# Patient Record
Sex: MALE | Race: WHITE | NOT HISPANIC OR LATINO | Employment: OTHER | URBAN - METROPOLITAN AREA
[De-identification: names, ages, dates, MRNs, and addresses within clinical notes are randomized per-mention and may not be internally consistent; named-entity substitution may affect disease eponyms.]

---

## 2019-10-31 ENCOUNTER — TRANSFERRED RECORDS (OUTPATIENT)
Dept: HEALTH INFORMATION MANAGEMENT | Facility: CLINIC | Age: 74
End: 2019-10-31

## 2019-11-20 ENCOUNTER — TRANSFERRED RECORDS (OUTPATIENT)
Dept: HEALTH INFORMATION MANAGEMENT | Facility: CLINIC | Age: 74
End: 2019-11-20

## 2020-08-06 ENCOUNTER — RECORDS - HEALTHEAST (OUTPATIENT)
Dept: ADMINISTRATIVE | Facility: OTHER | Age: 75
End: 2020-08-06

## 2020-08-06 ENCOUNTER — OFFICE VISIT (OUTPATIENT)
Dept: NEUROLOGY | Facility: CLINIC | Age: 75
End: 2020-08-06
Payer: COMMERCIAL

## 2020-08-06 VITALS — BODY MASS INDEX: 19.9 KG/M2 | WEIGHT: 139 LBS | HEIGHT: 70 IN

## 2020-08-06 DIAGNOSIS — I61.6 MULTIPLE LEFT-SIDED NONTRAUMATIC LOCALIZED INTRACEREBRAL HEMORRHAGES (H): Primary | ICD-10-CM

## 2020-08-06 PROBLEM — S06.5XAA SUBDURAL HEMATOMA (H): Status: ACTIVE | Noted: 2020-06-24

## 2020-08-06 PROBLEM — I10 HTN (HYPERTENSION): Status: RESOLVED | Noted: 2020-07-13 | Resolved: 2020-08-06

## 2020-08-06 PROBLEM — G93.5 COMPRESSION OF BRAIN (H): Status: ACTIVE | Noted: 2020-06-13

## 2020-08-06 PROBLEM — I10 MALIGNANT ESSENTIAL HYPERTENSION: Status: ACTIVE | Noted: 2020-06-24

## 2020-08-06 PROBLEM — I10 HTN (HYPERTENSION): Status: ACTIVE | Noted: 2020-07-13

## 2020-08-06 PROBLEM — I62.9 INTRACRANIAL HEMORRHAGE (H): Status: ACTIVE | Noted: 2020-08-06

## 2020-08-06 PROBLEM — I61.9 INTRACEREBRAL HEMORRHAGE, NONTRAUMATIC (H): Status: ACTIVE | Noted: 2020-06-13

## 2020-08-06 PROBLEM — G93.41 METABOLIC ENCEPHALOPATHY: Status: RESOLVED | Noted: 2020-06-24 | Resolved: 2020-08-06

## 2020-08-06 PROBLEM — G93.5 COMPRESSION OF BRAIN (H): Status: RESOLVED | Noted: 2020-06-13 | Resolved: 2020-08-06

## 2020-08-06 PROBLEM — H52.10 MYOPIA: Status: ACTIVE | Noted: 2020-08-06

## 2020-08-06 PROBLEM — G93.41 METABOLIC ENCEPHALOPATHY: Status: ACTIVE | Noted: 2020-06-24

## 2020-08-06 PROBLEM — I60.9 SAH (SUBARACHNOID HEMORRHAGE) (H): Status: ACTIVE | Noted: 2020-06-14

## 2020-08-06 PROCEDURE — 99214 OFFICE O/P EST MOD 30 MIN: CPT | Mod: 95 | Performed by: PSYCHIATRY & NEUROLOGY

## 2020-08-06 RX ORDER — AMLODIPINE BESYLATE 10 MG/1
10 TABLET ORAL
COMMUNITY
Start: 2020-06-20 | End: 2021-04-19

## 2020-08-06 RX ORDER — METOPROLOL SUCCINATE 50 MG/1
50 TABLET, EXTENDED RELEASE ORAL
COMMUNITY
Start: 2020-07-09 | End: 2024-07-11

## 2020-08-06 RX ORDER — LEVETIRACETAM 500 MG/1
500 TABLET ORAL 2 TIMES DAILY
Qty: 60 TABLET | Refills: 3 | Status: SHIPPED | OUTPATIENT
Start: 2020-08-06 | End: 2020-10-23

## 2020-08-06 RX ORDER — HYDRALAZINE HYDROCHLORIDE 50 MG/1
50 TABLET, FILM COATED ORAL
COMMUNITY
Start: 2020-06-19 | End: 2021-04-19

## 2020-08-06 RX ORDER — AMOXICILLIN 250 MG
2 CAPSULE ORAL
COMMUNITY
Start: 2020-06-19 | End: 2024-07-11

## 2020-08-06 RX ORDER — TAMSULOSIN HYDROCHLORIDE 0.4 MG/1
0.4 CAPSULE ORAL
COMMUNITY
Start: 2020-07-09

## 2020-08-06 RX ORDER — LIDOCAINE 4 G/G
PATCH TOPICAL
COMMUNITY
Start: 2020-07-08 | End: 2021-04-19

## 2020-08-06 RX ORDER — ESCITALOPRAM OXALATE 10 MG/1
10 TABLET ORAL AT BEDTIME
COMMUNITY
Start: 2020-07-09

## 2020-08-06 RX ORDER — LEVETIRACETAM 500 MG/1
500 TABLET ORAL 2 TIMES DAILY
COMMUNITY
Start: 2020-07-10 | End: 2020-08-06

## 2020-08-06 RX ORDER — MAG HYDROX/ALUMINUM HYD/SIMETH 400-400-40
1 SUSPENSION, ORAL (FINAL DOSE FORM) ORAL
COMMUNITY
End: 2021-04-19

## 2020-08-06 RX ORDER — MAGNESIUM HYDROXIDE 400 MG/5ML
SUSPENSION, ORAL (FINAL DOSE FORM) ORAL
COMMUNITY

## 2020-08-06 RX ORDER — MULTIPLE VITAMINS W/ MINERALS TAB 9MG-400MCG
1 TAB ORAL
COMMUNITY

## 2020-08-06 RX ORDER — PSYLLIUM HUSK 0.4 G
1000 CAPSULE ORAL
COMMUNITY
Start: 2020-07-08

## 2020-08-06 RX ORDER — ACETAMINOPHEN 500 MG
1 TABLET ORAL
COMMUNITY
Start: 2020-07-08

## 2020-08-06 SDOH — HEALTH STABILITY: MENTAL HEALTH: HOW OFTEN DO YOU HAVE A DRINK CONTAINING ALCOHOL?: MONTHLY OR LESS

## 2020-08-06 ASSESSMENT — MIFFLIN-ST. JEOR: SCORE: 1366.75

## 2020-08-06 NOTE — LETTER
2020         RE: John Dueñas  [address]  [facility] [address]        Dear Colleague,    Thank you for referring your patient, John Dueñas, to the [facility] [place]. Please see a copy of my visit note below.    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  Location raises the possibility of amyloid angiopathy.  He went for physical therapy and has made quite remarkable improvement.  He was started on Keppra and I have told him as there was extension of blood into the ventricle I would recommend continuing it at least until the end of 2020.  I have recommended:    Continue Keppra 500 mg twice daily until end of 2020.  It can be discontinued provided he remains seizure-free    Repeat MRI of the head and MRA to rule out underlying vascular abnormality    Continue physical and Occupational Therapy    Follow-up in 2 months for face-to-face visit    Thank you again for this referral, please feel free to contact me if you have any questions.    Tom Rivera MD  [facility]  (Formerly, [facility]     HISTORY OF PRESENT ILLNESS     Patient is a 75 years old male with history of hypertension, hyperlipidemia who was admitted to [facility] on 2020 with acute confusion and speech difficulty.  He had a CT of the  head that showed a large left parietal temporal intracerebral hemorrhage.  There was extension into the ventricle and repeat CT scan was done with gradual improvement.  MRI scan showed left temporal hemorrhage and question of amyloid angiopathy was raised.  He was managed with 3% protocol and also started on Keppra.  Due to extension of blood into the ventricle he would continue on Keppra.  He was evaluated by physical and Occupational Therapy and was transferred to rehab.  Since discharge he reports there has been significant improvement in his weakness.  He is able to ambulate.  He denies any headaches.  He has not seen neurosurgery and is wondering if any further testing is needed.  He continues to take Keppra and denies any side effects.    As noted during hospitalization has hemorrhage raise the possibility of amyloid angiopathy although patient denies past history of any hemorrhage.     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    Surgical History:  He  has no past surgical history on file.     SOCIAL HISTORY     Reviewed, and he  reports that he has never smoked. He has never used smokeless tobacco. He reports previous alcohol use.     FAMILY HISTORY     Reviewed, and family history includes Chronic Obstructive Pulmonary Disease in his father; Diabetes in his maternal grandfather; Heart Disease in his brother; Multiple myeloma in his mother.  "    ALLERGIES     Allergies   Allergen Reactions     Ether Other (See Comments)     Lactose Other (See Comments)         REVIEW OF SYSTEMS     A 12 point review of system was performed and was negative except as outlined in the history of present illness.     HOME MEDICATIONS       Current Outpatient Medications:      acetaminophen (TYLENOL) 500 MG tablet, Take 1 tablet by mouth, Disp: , Rfl:      calcium citrate-vitamin D (CITRACAL) 315-250 MG-UNIT TABS per tablet, Take 2 tablets by mouth, Disp: , Rfl:      cholecalciferol (VITAMIN D-1000 MAX ST) 25 MCG (1000 UT) TABS, Take 1,000 Units by mouth, Disp: , Rfl:      escitalopram (LEXAPRO) 5 MG tablet, Take 5 mg by mouth At Bedtime, Disp: , Rfl:      Glucosamine-Chondroit-Vit C-Mn (GLUCOSAMINE CHONDROITINOITIN COMPLEX) CAPS, , Disp: , Rfl:      levETIRAcetam (KEPPRA) 500 MG tablet, Take 500 mg by mouth 2 times daily, Disp: , Rfl:      melatonin 1 MG TABS tablet, Take 4 mg by mouth, Disp: , Rfl:      metoprolol succinate ER (TOPROL-XL) 50 MG 24 hr tablet, Take 50 mg by mouth, Disp: , Rfl:      multivitamin w/minerals (MULTI-VITAMIN) tablet, Take 1 tablet by mouth, Disp: , Rfl:      senna-docusate (SENOKOT-S/PERICOLACE) 8.6-50 MG tablet, Take 2 tablets by mouth, Disp: , Rfl:      tamsulosin (FLOMAX) 0.4 MG capsule, Take 0.4 mg by mouth, Disp: , Rfl:      amLODIPine (NORVASC) 10 MG tablet, Take 10 mg by mouth, Disp: , Rfl:      hydrALAZINE (APRESOLINE) 50 MG tablet, Take 50 mg by mouth, Disp: , Rfl:      Lidocaine (LIDOCARE) 4 % Patch, Apply to intact skin for upto 12 hrs within 24-hr period., Disp: , Rfl:      Omega-3 Fatty Acids (SUPER OMEGA 3 EPA/DHA PO), Take 1 tablet by mouth, Disp: , Rfl:      saw palmetto 450 MG CAPS capsule, Take 1 tablet by mouth, Disp: , Rfl:       PHYSICAL EXAM     Vital signs  Ht 1.778 m (5' 10\")   Wt 63 kg (139 lb)   BMI 19.94 kg/m      Weight:   139 lbs 0 oz    GENERAL PHYSICAL EXAM: Patient is alert and oriented x 4 in no acute distress. " Neck was supple, no carotid bruits, thyromegaly, lymphadenopathy or JVD noted.   NEUROLOGICAL EXAM:  Patient is alert and oriented x3 speech normal no dysarthria or aphasia.  He can repeat sentences.  He can do serial 7 and spell world backwards.  He does report that right side is weaker compared to the left.  Denies any sensory symptoms     DIAGNOSTIC STUDIES     PERTINENT RADIOLOGY  Following imaging studies were reviewed:     CT  6/23/20  1.  Temporal evolution of the large parenchymal hematoma in the left parietal and temporal lobe and casting hemorrhage in the left occipital horn. Blood products have evolved. No new hemorrhage.   2.  Moderate amount of vasogenic edema is similar to the prior study.   3.  7 mm of midline shift to the right side is stable. Basilar cisterns are patent.      6/19/20  . Large area of parenchymal hemorrhage is seen within the left cerebral hemisphere, slightly decreased in size in the interval. Surrounding mass effect surrounding edema with associated mass effect and left-sided sulcal effacement and approximately 6 mm   left-to-right midline shift, similar to prior.  2. Small volume intraventricular hemorrhage and subarachnoid hemorrhage, similar to prior.  3. No finding for interval hemorrhage, mass or acute infarct.     6/15/20  1.  Stable volume of acute intraparenchymal hemorrhage centered in the left parietal and temporal lobes, with stable to slightly increased surrounding edema.  2.  Decreased intraventricular hemorrhage layering in the occipital horns.  3.  Stable subarachnoid hemorrhage along the right temporal region.  4.  Unchanged mass effect with approximately 4 mm of left-to-right midline shift.     MRI  6/13/2020  1.  Large left parietal parenchymal hemorrhage again noted. It is grossly unchanged in size. There is new intraventricular extension layering in the occipital horns and left atrium lateral ventricle compared to the prior study. Temporal horns are   slightly  "more prominent on the prior examination. Close interval follow-up suggested. Overall mass effect and midline shift is similar to the prior examination.     2.  There is multifocal stippled and somewhat nodular enhancement around the periphery of the parietal hemorrhage as detailed above. No definite focal masslike enhancement is identified. This may represent vascular structures and can occasionally be seen   in active bleeding.     3.  Stable thin left convexity subdural hematoma.     4.  Small volume subarachnoid hemorrhage over the right frontal operculum and temporal convexity.     5.  Overall, the constellation of findings is nonspecific. Consider amyloid angiopathy. Given the stable abnormal enhancement, a component of cerebral amyloid angiopathy related inflammation is not excluded though considered less likely.     EEG  6/14/2020  This is a normal EEG during wakefulness and drowsiness except due to mild background dysrhythmia and the EEG being limited due to motion artifact.  EEG is not suggestive of epilepsy.  Further clinical correlation is needed     PERTINENT LABS  Following labs were reviewed:     Ref. Range 6/23/2020 05:14   Sodium Latest Ref Range: 136 - 145 mmol/L 133 (L)   Potassium Latest Ref Range: 3.5 - 5.0 mmol/L 3.5   Chloride Latest Ref Range: 98 - 107 mmol/L 100   CO2 Latest Ref Range: 22 - 31 mmol/L 26   Anion Gap, Calculation Latest Ref Range: 5 - 18 mmol/L 7   BUN Latest Ref Range: 8 - 28 mg/dL 15   Creatinine Latest Ref Range: 0.70 - 1.30 mg/dL 0.73   GFR MDRD Af Amer Latest Ref Range: >60 mL/min/1.73m2 >60   GFR MDRD Non Af Amer Latest Ref Range: >60 mL/min/1.73m2 >60   Calcium Latest Ref Range: 8.5 - 10.5 mg/dL 8.1 (L)   Glucose Latest Ref Range: 70 - 125 mg/dL 109         TELEPHONE VISIT CONSENT   This telephone visit was done in lieu of a face to face visit due to COVID 19 pandemic. The patient has been notified of following:    \"This telephone visit will be conducted via a call " "between you and your physician/provider. We have found that certain health care needs can be provided without the need for a physical exam. This service lets us provide the care you need with a short phone conversation. If a prescription is necessary we can send it directly to your pharmacy. If lab work is needed we can place an order for that and you can then stop by our lab to have the test done at a later time. If during the course of the call the physician/provider feels a telephone visit is not appropriate, you will not be charged for this service.\"    Patient has given verbal consent for Telephone visit? YES  Consent has been obtained for this service by 1 care team member: YES    Total Time: visit 30 minutes    Total time spent for face to face visit, reviewing labs/imaging studies, counseling and coordination of care was: 30 Minutes More than 50% of this time was spent on counseling and coordination of care.      This note was dictated using voice recognition software.  Any grammatical or context distortions are unintentional and inherent to the software.           Again, thank you for allowing me to participate in the care of your patient.        Sincerely,        Tom Rivera MD    "

## 2020-08-06 NOTE — NURSING NOTE
Chief Complaint   Patient presents with     Stroke     Hospital follow up- doing home PT at this time which is helping     Phone visit     Call [phone]     Aura Felipe CMA on 8/6/2020 at 11:17 AM

## 2020-08-06 NOTE — PATIENT INSTRUCTIONS
Patient Education     Arm Care After a Stroke    After a stroke, many people are left with problems with one of their arms. Proper arm care after a stroke can help treat these problems. It can also help prevent new problems from starting. Arm care may include placing the arms in the proper position, using devices such as a sling or brace, and taking care to prevent further injury during rehabilitation.  How a stroke can cause arm problems  Stroke often causes paralysis (hemiplegia) or weakness (hemiparalysis) of one or more of the muscles in your arm or shoulder. The muscles might feel tight instead of weak (spasticity). In general, stroke might increase or decrease the normal tension (muscle tone) in these muscles. You may also have numbness or limited feeling in your arm.  The shoulder is often a problem area after a stroke. The shoulder blade (scapula) and the upper arm bone (humerus) come together to form the shoulder joint. This joint is shaped like a ball and socket. Problems with the shoulder muscles can cause this joint to partly dislocate because of the weight of your arm. This partial separation (subluxation) makes your shoulder droop down.  Subluxation can cause pain when you move your arm. You may also feel like your shoulder is out of joint. Muscles, tendons, and ligaments can become overstretched. These muscle problems can lead to other problems with your shoulder. For example, you may not be able to move your shoulder as much as you used to. Some of the muscles may also be permanently shortened. This is called contracture.  How arm care helps  Arm care after a stroke helps prevent and treat problems. If you have had a stroke, it is very likely that you will need some sort of arm care treatment while you recover function. Most people who have a stroke need treatment for trouble with the muscles of their arm or shoulder, and shoulder pain is common. This treatment often starts right after a stroke.  Even if you only have minor harm from your stroke, proper arm care can help keep future problems from occurring.  Your treatment plan  Learn everything you can about your treatment plan. Your healthcare team will work with you to design a treatment plan to fit your needs. You may work with a physiatrist. This is a doctor who specializes in rehabilitative medicine. You will likely work with a physical therapist. This is a therapist who can teach you safe exercises to improve the strength, endurance, and range of motion in your arm, shoulder, and hand. An occupational therapist can help you learn to regain skills needed for everyday living using your arm. This may include using assistive devices, such as braces or arm rails.  Expect your treatment plan to change as you recover. Talk with the members of your medical team about how things are going. If an exercise causes pain, stop the exercise and let someone know right away.  Some people regain full use of their arm in the weeks after a stroke. Many others still have some weakness, pain, or other problems with their arm. You may continue to benefit from arm therapy. Your medical team can tailor your treatment plan to your needs.  Protecting your shoulder joint  Preventing subluxation is one of the most important goals of arm care after a stroke. To prevent this problem, you must protect your arm at the shoulder joint. You will need to control the shoulder joint when you move around. Make sure all of your caregivers know about the proper ways to help you.    Don't let anyone pull on your arm.    Don't let anyone help you stand or move by lifting under your armpits.    Don't lean your body weight on anything in your armpit while standing or walking.    Keep your affected arm supported and immobile when you stand up. Use your strong arm to help pull yourself up.    Keep your arm in a sling or harness after your stroke, if advised.    Support your affected arm while  sitting. If you re in a wheelchair, rest it on one of the chair s arm. You can also rest your arm on things such as a lap tray or pillow.  Other types of proper positioning    When lying on your unaffected side, use 1 or 2 pillows for your head. Your affected shoulder should be forward, with your arm resting on a pillow.    When lying on your affected side, use 1 or 2 pillows for your head. Your affected shoulder should be positioned comfortably.    When sitting up, sit fully back into the chair. Place your arms forward onto 2 pillows on a table. Your feet should be flat on the floor.    When lying on your back, place 3 pillows supporting both your shoulders and your head. Place your affected arm on a pillow.    When sitting in bed, sit upright, well supported by pillows. Place both arms on pillows. This is usually only recommended for limited periods.  Your physical therapist may advise other positions that are safe for you. You may also do physical therapy exercises. These are to help you regain strength and flexibility in your muscles.  Additional treatments  If you continue to have arm problems, your healthcare team might try other treatments such as:    Constraint-induced movement therapy. This involves using your affected arm a lot and not using your unaffected arm. A therapist might help you with this. Or it could be robot-assisted.    Botulinum toxin injections. This can help to reduce tightness in the arm muscles.    Electrical stimulation of muscles. The weakened muscles in your arm or shoulder may be treated with electricity. This can help strengthen your weakened arm.    Electrical stimulation of the brain. This may be done during rehabilitation exercises and may help you be able to move your arm better.    Motor imagery. This method may help you be able to use your arm more easily.    Biofeedback exercises. These may help you be able to move your arm better and reduce pain.    Pain medicine. These may  be needed to ease shoulder pain if subluxation has occurred.  Depending on your situation, these treatments might be used early or late in your therapy. Ongoing physical therapy may also help you reduce chronic pain as you regain your strength and flexibility.  Date Last Reviewed: 6/1/2017 2000-2019 [employer]. [address] [place] [address], [place] [address]. All rights reserved. This information is not intended as a substitute for professional medical care. Always follow your healthcare professional's instructions.

## 2020-08-06 NOTE — PROGRESS NOTES
NEUROLOGY FOLLOW UP VISIT  NOTE       [facility] NEUROLOGY [place]  [address] [place] [address]  Tel: [phone]  Fax: [phone]  [email or web address]     John DueñasJAN 1945, MRN [id number]  PCP: Malcolm Sun, [phone]  Date: 2020     ASSESSMENT & PLAN     Diagnosis code: Intracranial hemorrhage     Left parietal ICH with extension into ventricle, likely hypertensive, R/O amyloid angiopathy  75 years old male with history of hypertension, impaired fasting glucose, hyperlipidemia who was admitted to the hospital with large left parietal intracerebral hemorrhage with extension into the ventricles.  Location raises the possibility of amyloid angiopathy.  He went for physical therapy and has made quite remarkable improvement.  He was started on Keppra and I have told him as there was extension of blood into the ventricle I would recommend continuing it at least until the end of 2020.  I have recommended:    Continue Keppra 500 mg twice daily until end of 2020.  It can be discontinued provided he remains seizure-free    Repeat MRI of the head and MRA to rule out underlying vascular abnormality    Continue physical and Occupational Therapy    Follow-up in 2 months for face-to-face visit    Thank you again for this referral, please feel free to contact me if you have any questions.    Tom Rivera MD  [facility]  (Formerly, [facility]     HISTORY OF PRESENT ILLNESS     Patient is a 75 years old male with history of hypertension, hyperlipidemia who was admitted to [facility] on 2020 with acute confusion and speech difficulty.  He had a CT of the head that showed a large left parietal temporal intracerebral hemorrhage.  There was extension into the ventricle and repeat CT scan was done with gradual improvement.  MRI scan showed left temporal hemorrhage and question of amyloid angiopathy was  raised.  He was managed with 3% protocol and also started on Keppra.  Due to extension of blood into the ventricle he would continue on Keppra.  He was evaluated by physical and Occupational Therapy and was transferred to rehab.  Since discharge he reports there has been significant improvement in his weakness.  He is able to ambulate.  He denies any headaches.  He has not seen neurosurgery and is wondering if any further testing is needed.  He continues to take Keppra and denies any side effects.    As noted during hospitalization has hemorrhage raise the possibility of amyloid angiopathy although patient denies past history of any hemorrhage.     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    Surgical History:  He  has no past surgical history on file.     SOCIAL HISTORY     Reviewed, and he  reports that he has never smoked. He has never used smokeless tobacco. He reports previous alcohol use.     FAMILY HISTORY     Reviewed, and family history includes Chronic Obstructive Pulmonary Disease in his father; Diabetes in his maternal grandfather; Heart Disease in his brother; Multiple myeloma in his mother.     ALLERGIES     Allergies   Allergen Reactions     Ether Other (See Comments)     Lactose Other (See Comments)         REVIEW OF SYSTEMS     A 12 point review of system was performed and was negative except as outlined in the history of present  "illness.     HOME MEDICATIONS       Current Outpatient Medications:      acetaminophen (TYLENOL) 500 MG tablet, Take 1 tablet by mouth, Disp: , Rfl:      calcium citrate-vitamin D (CITRACAL) 315-250 MG-UNIT TABS per tablet, Take 2 tablets by mouth, Disp: , Rfl:      cholecalciferol (VITAMIN D-1000 MAX ST) 25 MCG (1000 UT) TABS, Take 1,000 Units by mouth, Disp: , Rfl:      escitalopram (LEXAPRO) 5 MG tablet, Take 5 mg by mouth At Bedtime, Disp: , Rfl:      Glucosamine-Chondroit-Vit C-Mn (GLUCOSAMINE CHONDROITINOITIN COMPLEX) CAPS, , Disp: , Rfl:      levETIRAcetam (KEPPRA) 500 MG tablet, Take 500 mg by mouth 2 times daily, Disp: , Rfl:      melatonin 1 MG TABS tablet, Take 4 mg by mouth, Disp: , Rfl:      metoprolol succinate ER (TOPROL-XL) 50 MG 24 hr tablet, Take 50 mg by mouth, Disp: , Rfl:      multivitamin w/minerals (MULTI-VITAMIN) tablet, Take 1 tablet by mouth, Disp: , Rfl:      senna-docusate (SENOKOT-S/PERICOLACE) 8.6-50 MG tablet, Take 2 tablets by mouth, Disp: , Rfl:      tamsulosin (FLOMAX) 0.4 MG capsule, Take 0.4 mg by mouth, Disp: , Rfl:      amLODIPine (NORVASC) 10 MG tablet, Take 10 mg by mouth, Disp: , Rfl:      hydrALAZINE (APRESOLINE) 50 MG tablet, Take 50 mg by mouth, Disp: , Rfl:      Lidocaine (LIDOCARE) 4 % Patch, Apply to intact skin for upto 12 hrs within 24-hr period., Disp: , Rfl:      Omega-3 Fatty Acids (SUPER OMEGA 3 EPA/DHA PO), Take 1 tablet by mouth, Disp: , Rfl:      saw palmetto 450 MG CAPS capsule, Take 1 tablet by mouth, Disp: , Rfl:       PHYSICAL EXAM     Vital signs  Ht 1.778 m (5' 10\")   Wt 63 kg (139 lb)   BMI 19.94 kg/m      Weight:   139 lbs 0 oz    GENERAL PHYSICAL EXAM: Patient is alert and oriented x 4 in no acute distress. Neck was supple, no carotid bruits, thyromegaly, lymphadenopathy or JVD noted.   NEUROLOGICAL EXAM:  Patient is alert and oriented x3 speech normal no dysarthria or aphasia.  He can repeat sentences.  He can do serial 7 and spell world backwards.  " He does report that right side is weaker compared to the left.  Denies any sensory symptoms     DIAGNOSTIC STUDIES     PERTINENT RADIOLOGY  Following imaging studies were reviewed:     CT  6/23/20  1.  Temporal evolution of the large parenchymal hematoma in the left parietal and temporal lobe and casting hemorrhage in the left occipital horn. Blood products have evolved. No new hemorrhage.   2.  Moderate amount of vasogenic edema is similar to the prior study.   3.  7 mm of midline shift to the right side is stable. Basilar cisterns are patent.      6/19/20  . Large area of parenchymal hemorrhage is seen within the left cerebral hemisphere, slightly decreased in size in the interval. Surrounding mass effect surrounding edema with associated mass effect and left-sided sulcal effacement and approximately 6 mm   left-to-right midline shift, similar to prior.  2. Small volume intraventricular hemorrhage and subarachnoid hemorrhage, similar to prior.  3. No finding for interval hemorrhage, mass or acute infarct.     6/15/20  1.  Stable volume of acute intraparenchymal hemorrhage centered in the left parietal and temporal lobes, with stable to slightly increased surrounding edema.  2.  Decreased intraventricular hemorrhage layering in the occipital horns.  3.  Stable subarachnoid hemorrhage along the right temporal region.  4.  Unchanged mass effect with approximately 4 mm of left-to-right midline shift.     MRI  6/13/2020  1.  Large left parietal parenchymal hemorrhage again noted. It is grossly unchanged in size. There is new intraventricular extension layering in the occipital horns and left atrium lateral ventricle compared to the prior study. Temporal horns are   slightly more prominent on the prior examination. Close interval follow-up suggested. Overall mass effect and midline shift is similar to the prior examination.     2.  There is multifocal stippled and somewhat nodular enhancement around the periphery of the  "parietal hemorrhage as detailed above. No definite focal masslike enhancement is identified. This may represent vascular structures and can occasionally be seen   in active bleeding.     3.  Stable thin left convexity subdural hematoma.     4.  Small volume subarachnoid hemorrhage over the right frontal operculum and temporal convexity.     5.  Overall, the constellation of findings is nonspecific. Consider amyloid angiopathy. Given the stable abnormal enhancement, a component of cerebral amyloid angiopathy related inflammation is not excluded though considered less likely.     EEG  6/14/2020  This is a normal EEG during wakefulness and drowsiness except due to mild background dysrhythmia and the EEG being limited due to motion artifact.  EEG is not suggestive of epilepsy.  Further clinical correlation is needed     PERTINENT LABS  Following labs were reviewed:     Ref. Range 6/23/2020 05:14   Sodium Latest Ref Range: 136 - 145 mmol/L 133 (L)   Potassium Latest Ref Range: 3.5 - 5.0 mmol/L 3.5   Chloride Latest Ref Range: 98 - 107 mmol/L 100   CO2 Latest Ref Range: 22 - 31 mmol/L 26   Anion Gap, Calculation Latest Ref Range: 5 - 18 mmol/L 7   BUN Latest Ref Range: 8 - 28 mg/dL 15   Creatinine Latest Ref Range: 0.70 - 1.30 mg/dL 0.73   GFR MDRD Af Amer Latest Ref Range: >60 mL/min/1.73m2 >60   GFR MDRD Non Af Amer Latest Ref Range: >60 mL/min/1.73m2 >60   Calcium Latest Ref Range: 8.5 - 10.5 mg/dL 8.1 (L)   Glucose Latest Ref Range: 70 - 125 mg/dL 109         TELEPHONE VISIT CONSENT   This telephone visit was done in lieu of a face to face visit due to COVID 19 pandemic. The patient has been notified of following:    \"This telephone visit will be conducted via a call between you and your physician/provider. We have found that certain health care needs can be provided without the need for a physical exam. This service lets us provide the care you need with a short phone conversation. If a prescription is necessary we " "can send it directly to your pharmacy. If lab work is needed we can place an order for that and you can then stop by our lab to have the test done at a later time. If during the course of the call the physician/provider feels a telephone visit is not appropriate, you will not be charged for this service.\"    Patient has given verbal consent for Telephone visit? YES  Consent has been obtained for this service by 1 care team member: YES    Total Time: visit 30 minutes    Total time spent for face to face visit, reviewing labs/imaging studies, counseling and coordination of care was: 30 Minutes More than 50% of this time was spent on counseling and coordination of care.      This note was dictated using voice recognition software.  Any grammatical or context distortions are unintentional and inherent to the software.         "

## 2020-08-31 ENCOUNTER — HOSPITAL ENCOUNTER (OUTPATIENT)
Dept: MRI IMAGING | Facility: HOSPITAL | Age: 75
Discharge: HOME | End: 2020-08-31
Attending: PSYCHIATRY & NEUROLOGY

## 2020-08-31 ENCOUNTER — RECORDS - HEALTHEAST (OUTPATIENT)
Dept: LAB | Facility: HOSPITAL | Age: 75
End: 2020-08-31

## 2020-08-31 DIAGNOSIS — I61.6 MULTIPLE LEFT-SIDED NONTRAUMATIC LOCALIZED INTRACEREBRAL HEMORRHAGES (H): ICD-10-CM

## 2020-08-31 LAB — LEVETIRACETAM (KEPPRA): 24.6 UG/ML (ref 6–46)

## 2020-10-05 ENCOUNTER — OFFICE VISIT (OUTPATIENT)
Dept: NEUROLOGY | Facility: CLINIC | Age: 75
End: 2020-10-05
Payer: COMMERCIAL

## 2020-10-05 ENCOUNTER — RECORDS - HEALTHEAST (OUTPATIENT)
Dept: ADMINISTRATIVE | Facility: OTHER | Age: 75
End: 2020-10-05

## 2020-10-05 VITALS — BODY MASS INDEX: 19.76 KG/M2 | HEIGHT: 70 IN | WEIGHT: 138 LBS

## 2020-10-05 DIAGNOSIS — I61.0 NONTRAUMATIC SUBCORTICAL HEMORRHAGE OF LEFT CEREBRAL HEMISPHERE (H): Primary | ICD-10-CM

## 2020-10-05 DIAGNOSIS — I68.0 CEREBRAL AMYLOID ANGIOPATHY (CODE): ICD-10-CM

## 2020-10-05 PROCEDURE — 99214 OFFICE O/P EST MOD 30 MIN: CPT | Performed by: PSYCHIATRY & NEUROLOGY

## 2020-10-05 RX ORDER — MEMANTINE HYDROCHLORIDE 5 MG/1
1 TABLET ORAL 2 TIMES DAILY
COMMUNITY
Start: 2020-09-12 | End: 2021-04-19

## 2020-10-05 RX ORDER — LISINOPRIL 10 MG/1
TABLET ORAL
COMMUNITY
Start: 2020-07-09 | End: 2024-07-11

## 2020-10-05 ASSESSMENT — MIFFLIN-ST. JEOR: SCORE: 1362.21

## 2020-10-05 NOTE — LETTER
10/5/2020         RE: John Dueñas  [address] [facility] [address]        Dear Colleague,    Thank you for referring your patient, John Dueñas, to the [facility] [place]. Please see a copy of my visit note below.    NEUROLOGY FOLLOW UP VISIT  NOTE       [facility] NEUROLOGY [place]  [address] [place] [address]  Tel: [phone]  Fax: [phone]  [email or web address]     John Dueñas,  1945, MRN [id number]  PCP: Malcolm Sun, [phone]  Date: 10/5/2020     ASSESSMENT & PLAN     Diagnosis code: Intracranial hemorrhage     Left parietal ICH with extension into ventricle, likely hypertensive, R/O amyloid angiopathy  75 years old male with history of hypertension, hyperlipidemia who was admitted to [facility] in 2020 with confusion and speech difficulty.  CT of the head at that time showed large left parietal temporal intracerebral hemorrhage.  This has continued to get smaller in size on most recent MRI scan done on 2020 continues to show hemorrhage but a repeat MRI with contrast was recommended once blood is completely reabsorbed.  He has continued on Keppra and most recent level was therapeutic.  I have recommended continuing with Keppra for another 3 months and to repeat MRI head with and without contrast and if no underlying mass lesion is noted, I will taper him off Keppra.  He is also struggling with short-term memory and after the blood is reabsorbed I will refer him for neuropsych testing.  I should note that he was started on Namenda by his primary care physician.  Follow-up will be in 3 months    Thank you again for this referral, please feel free to contact me if you have any questions.    Tmo Rivera MD  [facility]  (Former[facility], [facility]     HISTORY OF PRESENT ILLNESS     Patient is 75 years old male with history of hypertension, hyperlipidemia  who was admitted to [facility] on [facility]/13/2020 with confusion and speech difficulty.  CT of the head at that time showed a large left parietal temporal intracerebral hemorrhage.  There was extension into the ventricle and repeat CT scan was done with gradual improvement.  MRI scan showed left temporal hemorrhage and question of amyloid angiopathy was raised.  He was started on Keppra and was discharged.  He went through physical therapy and acute rehab with improvement.  He had a repeat MRI done on 8/31/2020 that shows the intraparenchymal hemorrhage in the left parietal lobe with extension into the left temporoparietal region has significantly decreased.  A repeat MRI with contrast was recommended once blood is completely reabsorbed.  He is currently on Keppra and a recent Keppra level was normal.    During hospitalization location of the hemorrhage raised the possibility of amyloid angiopathy.  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    Surgical History:  He  has a past surgical history that includes tonsillectomy.     SOCIAL HISTORY     Reviewed, and he  reports that he has never smoked. He has never used smokeless tobacco. He reports previous alcohol use.     FAMILY HISTORY     Reviewed, and family history includes Chronic Obstructive Pulmonary Disease in his father; Diabetes in his maternal grandfather; Heart Disease in his brother; Multiple myeloma in his mother.     ALLERGIES     Allergies    Allergen Reactions     Ether Other (See Comments)     Lactose Other (See Comments)         REVIEW OF SYSTEMS     A 12 point review of system was performed and was negative except as outlined in the history of present illness.     HOME MEDICATIONS       Current Outpatient Medications:      acetaminophen (TYLENOL) 500 MG tablet, Take 1 tablet by mouth, Disp: , Rfl:      calcium citrate-vitamin D (CITRACAL) 315-250 MG-UNIT TABS per tablet, Take 2 tablets by mouth, Disp: , Rfl:      cholecalciferol (VITAMIN D-1000 MAX ST) 25 MCG (1000 UT) TABS, Take 1,000 Units by mouth, Disp: , Rfl:      escitalopram (LEXAPRO) 5 MG tablet, Take 5 mg by mouth At Bedtime, Disp: , Rfl:      Glucosamine-Chondroit-Vit C-Mn (GLUCOSAMINE CHONDROITINOITIN COMPLEX) CAPS, , Disp: , Rfl:      levETIRAcetam (KEPPRA) 500 MG tablet, Take 1 tablet (500 mg) by mouth 2 times daily, Disp: 60 tablet, Rfl: 3     lisinopril (ZESTRIL) 10 MG tablet, TAKE 1 TABLET BY MOUTH EVERY EVENING, Disp: , Rfl:      melatonin 1 MG TABS tablet, Take 3 mg by mouth , Disp: , Rfl:      memantine (NAMENDA) 5 MG tablet, Take 1 tablet by mouth 2 times daily, Disp: , Rfl:      metoprolol succinate ER (TOPROL-XL) 50 MG 24 hr tablet, Take 50 mg by mouth, Disp: , Rfl:      multivitamin w/minerals (MULTI-VITAMIN) tablet, Take 1 tablet by mouth, Disp: , Rfl:      senna-docusate (SENOKOT-S/PERICOLACE) 8.6-50 MG tablet, Take 2 tablets by mouth, Disp: , Rfl:      tamsulosin (FLOMAX) 0.4 MG capsule, Take 0.4 mg by mouth, Disp: , Rfl:      vitamin B-12 (CYANOCOBALAMIN) 100 MCG tablet, Take 100 mcg by mouth, Disp: , Rfl:      amLODIPine (NORVASC) 10 MG tablet, Take 10 mg by mouth, Disp: , Rfl:      hydrALAZINE (APRESOLINE) 50 MG tablet, Take 50 mg by mouth, Disp: , Rfl:      Lidocaine (LIDOCARE) 4 % Patch, Apply to intact skin for upto 12 hrs within 24-hr period., Disp: , Rfl:      Omega-3 Fatty Acids (SUPER OMEGA 3 EPA/DHA PO), Take 1 tablet by mouth, Disp: , Rfl:      saw palmetto 450  "MG CAPS capsule, Take 1 tablet by mouth, Disp: , Rfl:       PHYSICAL EXAM     Vital signs  Ht 1.778 m (5' 10\")   Wt 62.6 kg (138 lb)   BMI 19.80 kg/m      Weight:   138 lbs 0 oz    GENERAL PHYSICAL EXAM: Patient is alert and oriented x 4 in no acute distress. Neck was supple, no carotid bruits, thyromegaly, lymphadenopathy or JVD noted.   NEUROLOGICAL EXAM:  Patient is alert and oriented times 3 he can tell me his name date and place.  He can tell me the name of the president but he struggles with serial 7.  Pupil equal round and reactive face symmetrical tongue midline.  He has right pronator drift.  Strength on the right 5-/5 on the left 5/5.  Reflexes 2+ on the right 1+ on the left he has some dysmetria on finger-nose testing he has a wide-based gait and somewhat unstable     DIAGNOSTIC STUDIES     PERTINENT RADIOLOGY  Following imaging studies were reviewed:     CT  6/23/20  1.  Temporal evolution of the large parenchymal hematoma in the left parietal and temporal lobe and casting hemorrhage in the left occipital horn. Blood products have evolved. No new hemorrhage.   2.  Moderate amount of vasogenic edema is similar to the prior study.   3.  7 mm of midline shift to the right side is stable. Basilar cisterns are patent.      6/19/20  . Large area of parenchymal hemorrhage is seen within the left cerebral hemisphere, slightly decreased in size in the interval. Surrounding mass effect surrounding edema with associated mass effect and left-sided sulcal effacement and approximately 6 mm   left-to-right midline shift, similar to prior.  2. Small volume intraventricular hemorrhage and subarachnoid hemorrhage, similar to prior.  3. No finding for interval hemorrhage, mass or acute infarct.     6/15/20  1.  Stable volume of acute intraparenchymal hemorrhage centered in the left parietal and temporal lobes, with stable to slightly increased surrounding edema.  2.  Decreased intraventricular hemorrhage layering in the " occipital horns.  3.  Stable subarachnoid hemorrhage along the right temporal region.  4.  Unchanged mass effect with approximately 4 mm of left-to-right midline shift.     MRI  8/31/20  1.  Previously described intraparenchymal hematoma in the left parietal lobe with extension to the left temporoparietal region has markedly decreased in size since previous, now measuring 3.0 x 1.2 cm in AP x transverse dimensions. This primarily   involves the lateral left parietal lobe with slight extension to the left frontoparietal region and is accompanied by hemosiderin staining extending into the left temporoparietal region. While there is a small amount of FLAIR hyperintense diffusion   restriction compatible with acute to very early subacute infarct along the anterior and medial margin of the above-described residual intraparenchymal hemorrhage in the left parietal lobe, this presumably relates to an evolving parenchymal injury from   the above-described intraparenchymal hematoma and is accompanied by a background of adjacent FLAIR hyperintense presumed gliosis. Without contrast, evaluation for an abnormal underlying mass is limited. Follow-up contrast-enhanced brain MRI after   resolution of the above-described residual intraparenchymal hemorrhage will be of benefit to exclude an underlying mass.     2.  Previously described subarachnoid hemorrhage and intraventricular extension of hemorrhage have resolved in the interim.     3.  Hemosiderin staining in the left parietal lobe and left temporoparietal region is accompanied by a small amount of hemosiderin staining along the atrium and occipital horn of the left lateral ventricle.  6/13/2020  1.  Large left parietal parenchymal hemorrhage again noted. It is grossly unchanged in size. There is new intraventricular extension layering in the occipital horns and left atrium lateral ventricle compared to the prior study. Temporal horns are   slightly more prominent on the prior  examination. Close interval follow-up suggested. Overall mass effect and midline shift is similar to the prior examination.     2.  There is multifocal stippled and somewhat nodular enhancement around the periphery of the parietal hemorrhage as detailed above. No definite focal masslike enhancement is identified. This may represent vascular structures and can occasionally be seen   in active bleeding.     3.  Stable thin left convexity subdural hematoma.     4.  Small volume subarachnoid hemorrhage over the right frontal operculum and temporal convexity.     5.  Overall, the constellation of findings is nonspecific. Consider amyloid angiopathy. Given the stable abnormal enhancement, a component of cerebral amyloid angiopathy related inflammation is not excluded though considered less likely.     EEG  6/14/2020  This is a normal EEG during wakefulness and drowsiness except due to mild background dysrhythmia and the EEG being limited due to motion artifact.  EEG is not suggestive of epilepsy.  Further clinical correlation is needed     PERTINENT LABS  Following labs were reviewed:    Ref Range & Units 8/31/20 0906       Levetiracetam 6.0 - 46.0 ug/mL 24.6       Latest known visit with results is:   No results found for any previous visit.         Total time spent for face to face visit, reviewing labs/imaging studies, counseling and coordination of care was: 30 Minutes More than 50% of this time was spent on counseling and coordination of care.      This note was dictated using voice recognition software.  Any grammatical or context distortions are unintentional and inherent to the software.             Again, thank you for allowing me to participate in the care of your patient.        Sincerely,        Tom Rivera MD

## 2020-10-05 NOTE — NURSING NOTE
Interface, Rad Results In - 08/31/2020 10:02 AM CDT    EXAM: MR BRAIN WO CONTRAST  LOCATION: [facility]  DATE/TIME: 8/31/2020 8:45 AM    INDICATION: Nontraumatic intracerebral hemorrhage, multiple localized.  COMPARISON: 6/23/2020 and 6/19/2020 head CTs. 6/13/2020 brain MRI.  TECHNIQUE: Routine multiplanar, multisequence head MRI without intravenous contrast.    FINDINGS:  INTRACRANIAL CONTENTS: Previously described intraparenchymal hematoma demonstrates marked interval decrease in size and is now seen to involve the left parietal lobe with slight extension to the left frontoparietal region. This now measures 3.0 x 1.2 cm   in AP x transverse dimensions. Susceptibility marginates this small residual intraparenchymal hematoma and is accompanied by marked hemosiderin staining in the inferior left parietal lobe extending to the left temporoparietal region and the   posterolateral left temporal lobe. Hemosiderin staining extends up to the atrium of the left lateral ventricle and along the walls of the left occipital horn. Small amount of FLAIR hyperintense diffusion restriction seen along the medial and anterior   margin of the small residual intraparenchymal hematoma in the left parietal lobe relates to a small focus of adjacent infarct relating to this evolving hemorrhage. This is accompanied by a small amount of adjacent FLAIR hyperintense presumed gliosis and   is accompanied by mild ex vacuo dilatation of the atrium, occipital horn, and temporal horn of the left lateral ventricle. Without contrast, evaluation for an underlying abnormal mass is limited.    Previously present intraventricular hemorrhage and subarachnoid hemorrhage is no longer identified. No extraaxial collection. Background chronic small vessel ischemic changes are minimal. Mild to moderate diffuse parenchymal volume loss. Ventricular size  is in keeping with this volume loss and accompanied by ex vacuo dilatation in the posterior left  lateral ventricle as detailed above. Major intracranial vascular flow voids are preserved. Brainstem and cerebellum are unremarkable. Cerebellar tonsils are  normally positioned.    SELLA: No abnormality accounting for technique.    OSSEOUS STRUCTURES/SOFT TISSUES: Normal marrow signal.     ORBITS: No abnormality accounting for technique.     SINUSES/MASTOIDS: No paranasal sinus mucosal disease. No middle ear or mastoid effusion.     IMPRESSION:   1. Previously described intraparenchymal hematoma in the left parietal lobe with extension to the left temporoparietal region has markedly decreased in size since previous, now measuring 3.0 x 1.2 cm in AP x transverse dimensions. This primarily   involves the lateral left parietal lobe with slight extension to the left frontoparietal region and is accompanied by hemosiderin staining extending into the left temporoparietal region. While there is a small amount of FLAIR hyperintense diffusion   restriction compatible with acute to very early subacute infarct along the anterior and medial margin of the above-described residual intraparenchymal hemorrhage in the left parietal lobe, this presumably relates to an evolving parenchymal injury from   the above-described intraparenchymal hematoma and is accompanied by a background of adjacent FLAIR hyperintense presumed gliosis. Without contrast, evaluation for an abnormal underlying mass is limited. Follow-up contrast-enhanced brain MRI after   resolution of the above-described residual intraparenchymal hemorrhage will be of benefit to exclude an underlying mass.    2. Previously described subarachnoid hemorrhage and intraventricular extension of hemorrhage have resolved in the interim.    3. Hemosiderin staining in the left parietal lobe and left temporoparietal region is accompanied by a small amount of hemosiderin staining along the atrium and occipital horn of the left lateral ventricle.    4. Unchanged age-related changes, as  above.

## 2020-10-05 NOTE — NURSING NOTE
Chief Complaint   Patient presents with     Results     Discuss MRI and lab results     Aura Felipe CMA on 10/5/2020 at 9:43 AM

## 2020-10-05 NOTE — PROGRESS NOTES
NEUROLOGY FOLLOW UP VISIT  NOTE       [facility] NEUROLOGY [place]  [address] [place] [address]  Tel: [phone]  Fax: [phone]  [email or web address]     John DueñasJAN 1945, MRN [id number]  PCP: Malcolm Sun, [phone]  Date: 10/5/2020     ASSESSMENT & PLAN     Diagnosis code: Intracranial hemorrhage     Left parietal ICH with extension into ventricle, likely hypertensive, R/O amyloid angiopathy  75 years old male with history of hypertension, hyperlipidemia who was admitted to [facility] in 2020 with confusion and speech difficulty.  CT of the head at that time showed large left parietal temporal intracerebral hemorrhage.  This has continued to get smaller in size on most recent MRI scan done on 2020 continues to show hemorrhage but a repeat MRI with contrast was recommended once blood is completely reabsorbed.  He has continued on Keppra and most recent level was therapeutic.  I have recommended continuing with Keppra for another 3 months and to repeat MRI head with and without contrast and if no underlying mass lesion is noted, I will taper him off Keppra.  He is also struggling with short-term memory and after the blood is reabsorbed I will refer him for neuropsych testing.  I should note that he was started on Namenda by his primary care physician.  Follow-up will be in 3 months    Thank you again for this referral, please feel free to contact me if you have any questions.    Tom Rivera MD  [facility]  (Formerly, [facility]     HISTORY OF PRESENT ILLNESS     Patient is 75 years old male with history of hypertension, hyperlipidemia who was admitted to [facility] on 2020 with confusion and speech difficulty.  CT of the head at that time showed a large left parietal temporal intracerebral hemorrhage.  There was extension into the ventricle and repeat CT scan was done with gradual  improvement.  MRI scan showed left temporal hemorrhage and question of amyloid angiopathy was raised.  He was started on Keppra and was discharged.  He went through physical therapy and acute rehab with improvement.  He had a repeat MRI done on 8/31/2020 that shows the intraparenchymal hemorrhage in the left parietal lobe with extension into the left temporoparietal region has significantly decreased.  A repeat MRI with contrast was recommended once blood is completely reabsorbed.  He is currently on Keppra and a recent Keppra level was normal.    During hospitalization location of the hemorrhage raised the possibility of amyloid angiopathy.  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    Surgical History:  He  has a past surgical history that includes tonsillectomy.     SOCIAL HISTORY     Reviewed, and he  reports that he has never smoked. He has never used smokeless tobacco. He reports previous alcohol use.     FAMILY HISTORY     Reviewed, and family history includes Chronic Obstructive Pulmonary Disease in his father; Diabetes in his maternal grandfather; Heart Disease in his brother; Multiple myeloma in his mother.     ALLERGIES     Allergies   Allergen Reactions     Ether Other (See Comments)     Lactose Other (See Comments)         REVIEW OF SYSTEMS     A 12 point review of system was performed and was negative except as outlined in the history of present illness.     HOME MEDICATIONS       Current  "Outpatient Medications:      acetaminophen (TYLENOL) 500 MG tablet, Take 1 tablet by mouth, Disp: , Rfl:      calcium citrate-vitamin D (CITRACAL) 315-250 MG-UNIT TABS per tablet, Take 2 tablets by mouth, Disp: , Rfl:      cholecalciferol (VITAMIN D-1000 MAX ST) 25 MCG (1000 UT) TABS, Take 1,000 Units by mouth, Disp: , Rfl:      escitalopram (LEXAPRO) 5 MG tablet, Take 5 mg by mouth At Bedtime, Disp: , Rfl:      Glucosamine-Chondroit-Vit C-Mn (GLUCOSAMINE CHONDROITINOITIN COMPLEX) CAPS, , Disp: , Rfl:      levETIRAcetam (KEPPRA) 500 MG tablet, Take 1 tablet (500 mg) by mouth 2 times daily, Disp: 60 tablet, Rfl: 3     lisinopril (ZESTRIL) 10 MG tablet, TAKE 1 TABLET BY MOUTH EVERY EVENING, Disp: , Rfl:      melatonin 1 MG TABS tablet, Take 3 mg by mouth , Disp: , Rfl:      memantine (NAMENDA) 5 MG tablet, Take 1 tablet by mouth 2 times daily, Disp: , Rfl:      metoprolol succinate ER (TOPROL-XL) 50 MG 24 hr tablet, Take 50 mg by mouth, Disp: , Rfl:      multivitamin w/minerals (MULTI-VITAMIN) tablet, Take 1 tablet by mouth, Disp: , Rfl:      senna-docusate (SENOKOT-S/PERICOLACE) 8.6-50 MG tablet, Take 2 tablets by mouth, Disp: , Rfl:      tamsulosin (FLOMAX) 0.4 MG capsule, Take 0.4 mg by mouth, Disp: , Rfl:      vitamin B-12 (CYANOCOBALAMIN) 100 MCG tablet, Take 100 mcg by mouth, Disp: , Rfl:      amLODIPine (NORVASC) 10 MG tablet, Take 10 mg by mouth, Disp: , Rfl:      hydrALAZINE (APRESOLINE) 50 MG tablet, Take 50 mg by mouth, Disp: , Rfl:      Lidocaine (LIDOCARE) 4 % Patch, Apply to intact skin for upto 12 hrs within 24-hr period., Disp: , Rfl:      Omega-3 Fatty Acids (SUPER OMEGA 3 EPA/DHA PO), Take 1 tablet by mouth, Disp: , Rfl:      saw palmetto 450 MG CAPS capsule, Take 1 tablet by mouth, Disp: , Rfl:       PHYSICAL EXAM     Vital signs  Ht 1.778 m (5' 10\")   Wt 62.6 kg (138 lb)   BMI 19.80 kg/m      Weight:   138 lbs 0 oz    GENERAL PHYSICAL EXAM: Patient is alert and oriented x 4 in no acute distress. " Neck was supple, no carotid bruits, thyromegaly, lymphadenopathy or JVD noted.   NEUROLOGICAL EXAM:  Patient is alert and oriented times 3 he can tell me his name date and place.  He can tell me the name of the president but he struggles with serial 7.  Pupil equal round and reactive face symmetrical tongue midline.  He has right pronator drift.  Strength on the right 5-/5 on the left 5/5.  Reflexes 2+ on the right 1+ on the left he has some dysmetria on finger-nose testing he has a wide-based gait and somewhat unstable     DIAGNOSTIC STUDIES     PERTINENT RADIOLOGY  Following imaging studies were reviewed:     CT  6/23/20  1.  Temporal evolution of the large parenchymal hematoma in the left parietal and temporal lobe and casting hemorrhage in the left occipital horn. Blood products have evolved. No new hemorrhage.   2.  Moderate amount of vasogenic edema is similar to the prior study.   3.  7 mm of midline shift to the right side is stable. Basilar cisterns are patent.      6/19/20  . Large area of parenchymal hemorrhage is seen within the left cerebral hemisphere, slightly decreased in size in the interval. Surrounding mass effect surrounding edema with associated mass effect and left-sided sulcal effacement and approximately 6 mm   left-to-right midline shift, similar to prior.  2. Small volume intraventricular hemorrhage and subarachnoid hemorrhage, similar to prior.  3. No finding for interval hemorrhage, mass or acute infarct.     6/15/20  1.  Stable volume of acute intraparenchymal hemorrhage centered in the left parietal and temporal lobes, with stable to slightly increased surrounding edema.  2.  Decreased intraventricular hemorrhage layering in the occipital horns.  3.  Stable subarachnoid hemorrhage along the right temporal region.  4.  Unchanged mass effect with approximately 4 mm of left-to-right midline shift.     MRI  8/31/20  1.  Previously described intraparenchymal hematoma in the left parietal  lobe with extension to the left temporoparietal region has markedly decreased in size since previous, now measuring 3.0 x 1.2 cm in AP x transverse dimensions. This primarily   involves the lateral left parietal lobe with slight extension to the left frontoparietal region and is accompanied by hemosiderin staining extending into the left temporoparietal region. While there is a small amount of FLAIR hyperintense diffusion   restriction compatible with acute to very early subacute infarct along the anterior and medial margin of the above-described residual intraparenchymal hemorrhage in the left parietal lobe, this presumably relates to an evolving parenchymal injury from   the above-described intraparenchymal hematoma and is accompanied by a background of adjacent FLAIR hyperintense presumed gliosis. Without contrast, evaluation for an abnormal underlying mass is limited. Follow-up contrast-enhanced brain MRI after   resolution of the above-described residual intraparenchymal hemorrhage will be of benefit to exclude an underlying mass.     2.  Previously described subarachnoid hemorrhage and intraventricular extension of hemorrhage have resolved in the interim.     3.  Hemosiderin staining in the left parietal lobe and left temporoparietal region is accompanied by a small amount of hemosiderin staining along the atrium and occipital horn of the left lateral ventricle.  6/13/2020  1.  Large left parietal parenchymal hemorrhage again noted. It is grossly unchanged in size. There is new intraventricular extension layering in the occipital horns and left atrium lateral ventricle compared to the prior study. Temporal horns are   slightly more prominent on the prior examination. Close interval follow-up suggested. Overall mass effect and midline shift is similar to the prior examination.     2.  There is multifocal stippled and somewhat nodular enhancement around the periphery of the parietal hemorrhage as detailed above.  No definite focal masslike enhancement is identified. This may represent vascular structures and can occasionally be seen   in active bleeding.     3.  Stable thin left convexity subdural hematoma.     4.  Small volume subarachnoid hemorrhage over the right frontal operculum and temporal convexity.     5.  Overall, the constellation of findings is nonspecific. Consider amyloid angiopathy. Given the stable abnormal enhancement, a component of cerebral amyloid angiopathy related inflammation is not excluded though considered less likely.     EEG  6/14/2020  This is a normal EEG during wakefulness and drowsiness except due to mild background dysrhythmia and the EEG being limited due to motion artifact.  EEG is not suggestive of epilepsy.  Further clinical correlation is needed     PERTINENT LABS  Following labs were reviewed:    Ref Range & Units 8/31/20 0906       Levetiracetam 6.0 - 46.0 ug/mL 24.6       Latest known visit with results is:   No results found for any previous visit.         Total time spent for face to face visit, reviewing labs/imaging studies, counseling and coordination of care was: 30 Minutes More than 50% of this time was spent on counseling and coordination of care.      This note was dictated using voice recognition software.  Any grammatical or context distortions are unintentional and inherent to the software.

## 2020-10-22 DIAGNOSIS — I61.6 MULTIPLE LEFT-SIDED NONTRAUMATIC LOCALIZED INTRACEREBRAL HEMORRHAGES (H): ICD-10-CM

## 2020-10-22 NOTE — TELEPHONE ENCOUNTER
Per Dr. Rivera's office visit note from 10/5/20:     He has continued on Keppra and most recent level was therapeutic.  I have recommended continuing with Keppra for another 3 months and to repeat MRI head with and without contrast and if no underlying mass lesion is noted, I will taper him off Keppra.      Medication T'd for review and signature.  Briana Steve LPN on 10/22/2020 at 4:07 PM

## 2020-10-23 RX ORDER — LEVETIRACETAM 500 MG/1
500 TABLET ORAL 2 TIMES DAILY
Qty: 60 TABLET | Refills: 3 | Status: SHIPPED | OUTPATIENT
Start: 2020-10-23 | End: 2021-04-13

## 2020-12-01 ENCOUNTER — HOSPITAL ENCOUNTER (OUTPATIENT)
Dept: MRI IMAGING | Facility: HOSPITAL | Age: 75
Discharge: HOME | End: 2020-12-01
Attending: PSYCHIATRY & NEUROLOGY

## 2020-12-01 DIAGNOSIS — I68.0 CEREBRAL AMYLOID ANGIOPATHY (CODE): ICD-10-CM

## 2020-12-01 DIAGNOSIS — I61.0 NONTRAUMATIC SUBCORTICAL HEMORRHAGE OF LEFT CEREBRAL HEMISPHERE (H): ICD-10-CM

## 2020-12-01 LAB
CREAT BLD-MCNC: 0.9 MG/DL (ref 0.7–1.3)
GFR SERPL CREATININE-BSD FRML MDRD: >60 ML/MIN/1.73M2

## 2020-12-03 ENCOUNTER — TELEPHONE (OUTPATIENT)
Dept: NEUROLOGY | Facility: CLINIC | Age: 75
End: 2020-12-03

## 2020-12-03 DIAGNOSIS — I68.0 CEREBRAL AMYLOID ANGIOPATHY (CODE): ICD-10-CM

## 2020-12-03 DIAGNOSIS — I61.0 NONTRAUMATIC SUBCORTICAL HEMORRHAGE OF LEFT CEREBRAL HEMISPHERE (H): Primary | ICD-10-CM

## 2020-12-03 NOTE — TELEPHONE ENCOUNTER
MRI of the brain shows chronic left hematoma and continued evolution of the left temporoparietal hemorrhage.  Recommend:    1.  Repeat MRI of the head and MRA after 2 months.  See orders follow-up after 2 months.  Patient is scheduled to see me on 1/4/2021, we can reschedule it after he gets the imaging studies done.    MRI BRAIN: 12/1/20  1.  Chronic parenchymal hematoma within the left temporoparietal lobes with encephalomalacia and volume loss. Irregular central T2 hypointensity and enhancement likely relates to scarring, no definite underlying mass. Continued follow-up recommended to   establish longer-term stability. Consider vascular imaging if not previously performed to more definitively exclude vascular etiology of hemorrhage.

## 2020-12-04 NOTE — TELEPHONE ENCOUNTER
Quirino Gibson of the results and re-scheduled follow up for second week of Feb  Aura Felipe CMA on 12/4/2020 at 2:22 PM

## 2020-12-04 NOTE — TELEPHONE ENCOUNTER
Pt's wife calling. She got a call from MRI scheduling to make a appt. She is asking for clarification, as she is not aware of Dr Rivera's recommendations. . [phone]

## 2021-02-01 ENCOUNTER — HOSPITAL ENCOUNTER (OUTPATIENT)
Dept: MRI IMAGING | Facility: HOSPITAL | Age: 76
Discharge: HOME | End: 2021-02-01
Attending: PSYCHIATRY & NEUROLOGY

## 2021-02-01 DIAGNOSIS — I61.0: ICD-10-CM

## 2021-02-08 ENCOUNTER — AMBULATORY - HEALTHEAST (OUTPATIENT)
Dept: NEUROLOGY | Facility: CLINIC | Age: 76
End: 2021-02-08

## 2021-02-08 ENCOUNTER — OFFICE VISIT (OUTPATIENT)
Dept: NEUROLOGY | Facility: CLINIC | Age: 76
End: 2021-02-08
Payer: COMMERCIAL

## 2021-02-08 VITALS
BODY MASS INDEX: 22.19 KG/M2 | HEIGHT: 70 IN | WEIGHT: 155 LBS | HEART RATE: 53 BPM | SYSTOLIC BLOOD PRESSURE: 174 MMHG | DIASTOLIC BLOOD PRESSURE: 98 MMHG

## 2021-02-08 DIAGNOSIS — I61.0 NONTRAUMATIC SUBCORTICAL HEMORRHAGE OF LEFT CEREBRAL HEMISPHERE (H): Primary | ICD-10-CM

## 2021-02-08 DIAGNOSIS — R41.89 COGNITIVE DECLINE: ICD-10-CM

## 2021-02-08 PROCEDURE — 99214 OFFICE O/P EST MOD 30 MIN: CPT | Performed by: PSYCHIATRY & NEUROLOGY

## 2021-02-08 ASSESSMENT — MIFFLIN-ST. JEOR: SCORE: 1439.33

## 2021-02-08 NOTE — NURSING NOTE
Interface, Rad Results In - 02/01/2021 10:15 AM [facility]  MR BRAIN COW W WO CONTRAST  2/1/2021 9:14 AM    INDICATION: Nontraumatic intracranial hemorrhage.   TECHNIQUE:  Multiplanar T1- and T2-weighted images of the brain were obtained pre and post administration of intravenous contrast. 3D time-of-flight MRA [place] without intravenous contrast. 3D angiographic reconstructions were generated.  CONTRAST: 7 mL Gadavist IV.  SEDATION: None.  COMPARISON: MRI brain 12/01/2020. CTA [place] 01/25/2021.     FINDINGS:   MRI BRAIN:  INTRACRANIAL CONTENTS: Patient is status post prior left parietal craniotomy and hematoma evacuation. Again seen is an area of encephalomalacic change and marginal gliosis centered along the left frontoparietal junction and corresponding to the site of   prior hematoma evacuation. There is associated susceptibility in this locale compatible with hemosiderin staining. A linear band of apparent increased signal on diffusion-weighted images along the anterior superior aspect of the resection cavity is   favored to relate to an area of T2 shine through. No convincing finding for acute infarct. Linear band of enhancement is again seen within the area of encephalomalacia, stable compared to prior.    There is moderate to marked dilatation of the lateral ventricles with ex vacuo dilatation of the atrium of the left lateral ventricle. A few scattered small foci of FLAIR hyperintensity are seen within the cerebral white matter, nonspecific but   compatible small areas of gliosis or chronic myelin loss.    OSSEOUS STRUCTURES/SOFT TISSUES: Visualized marrow space is unremarkable.     ORBITS: Orbits are normal in appearance.     SINUSES/MASTOIDS: Minimal paranasal sinus mucosal thickening. No air-fluid levels. Middle ear cavities and mastoid air cells are clear.     MRA [place]: No vascular cutoff, aneurysm, or flow-limiting stenosis. Right posterior  communicating arteries are not identified. Diminutive left posterior communicating artery. Right vertebral artery is slightly larger than the left.    IMPRESSION:   MRI BRAIN:  1. No finding for acute infarct, acute hemorrhage, or mass.    2. Area of chronic encephalomalacic change, hemosiderin staining, and gliosis is seen along the left frontoparietal junction corresponding to the site of prior parenchymal hemorrhage.  2. Moderate to marked dilatation of the lateral ventricles, stable compared to prior.    MRA [place]:  1. No vascular cutoff from aneurysm, or flow-limiting stenosis.

## 2021-02-08 NOTE — LETTER
2021         RE: John Dueñas  [address]  [facility] [address]        Dear Colleague,    Thank you for referring your patient, John Dueñas, to the [facility] [place]. Please see a copy of my visit note below.    NEUROLOGY FOLLOW UP VISIT  NOTE       [facility]UR[facility] [place]  [address] [place] [address]  Tel: [phone]  Fax: [phone]  [email or web address]     John Dueñas,  1945, MRN [id number]  PCP: Malcolm Sun, None  Date: 2021     ASSESSMENT & PLAN     Diagnosis code  1. Nontraumatic subcortical hemorrhage of left cerebral hemisphere (H)     Left parietal intracerebral hemorrhage with extension into the ventricle  75 years old male with history of HTN, HLD who was admitted to [facility] in 2020 with confusion and speech difficulty.  He was noted to have left parietal temporal intracerebral hemorrhage with extension into the ventricle.  Afterwards he had serial MRI scan and most recent MRI and MRA shows encephalomalacia but no vascular abnormality.  He was started on Namenda by rehab physician and currently is in the process of tapering himself off Namenda.  I have recommended checking EEG and if normal, I will taper him off Keppra also.  Follow-up will be the day he gets EEG.    Cognitive decline  Wife is concerned since the hemorrhage he has difficulty with certain words and struggles with memory.  He has trouble with conceptualizing time.  He had a neuropsychological testing done in  at [facility] that was normal and I have recommended repeating neuropsychological testing and follow-up afterwards.    Thank you again for this referral, please feel free to contact me if you have any questions.    Tom Rivera MD  [facility]  (Formerly, [facility]     HISTORY OF PRESENT ILLNESS     Patient is a 75 years old male with no significant  past medical history was admitted to the hospital in June 2020 with acute confusion and some speech difficulty.  He had a CT of the head that showed left parietal intracerebral hemorrhage with extension into the ventricle.  Follow-up scan showed slight decrease but the size and location of hemorrhage raise the possibility of amyloid angiopathy.  Subsequently MRI of the brain was done that showed changes that raise the possibility of amyloid angiopathy and a follow-up scan was recommended.  He had a follow-up MRI in December 2020 that showed area of chronic encephalomalacia due to prior hemorrhage but no vascular abnormality was noted. Patient was interested in coming off Keppra but due to hemorrhage noted on his MRI scan decision was made to continue it.  I should note patient was started on Namenda during rehab and recently talked to his rehab physician and is in the process of tapering himself off Namenda.  Wife reports he still struggles with words and has difficulty with time.  Strength wise he has improved significantly.  There is no history of unexplained loss of consciousness or any tonic-clonic activity.     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    Surgical History:  He  has a past surgical history that includes tonsillectomy.     SOCIAL HISTORY     Reviewed, and he  reports that he has never smoked. He has never used smokeless tobacco. He reports previous alcohol use.     FAMILY HISTORY     Reviewed, and family history includes  Chronic Obstructive Pulmonary Disease in his father; Diabetes in his maternal grandfather; Heart Disease in his brother; Multiple myeloma in his mother.     ALLERGIES     Allergies   Allergen Reactions     Ether Other (See Comments)     Lactose Other (See Comments)         REVIEW OF SYSTEMS     A 12 point review of system was performed and was negative except as outlined in the history of present illness.     HOME MEDICATIONS       Current Outpatient Medications:      calcium citrate-vitamin D (CITRACAL) 315-250 MG-UNIT TABS per tablet, Take 2 tablets by mouth, Disp: , Rfl:      cholecalciferol (VITAMIN D-1000 MAX ST) 25 MCG (1000 UT) TABS, Take 1,000 Units by mouth, Disp: , Rfl:      escitalopram (LEXAPRO) 5 MG tablet, Take 5 mg by mouth At Bedtime, Disp: , Rfl:      Glucosamine-Chondroit-Vit C-Mn (GLUCOSAMINE CHONDROITINOITIN COMPLEX) CAPS, , Disp: , Rfl:      levETIRAcetam (KEPPRA) 500 MG tablet, Take 1 tablet (500 mg) by mouth 2 times daily, Disp: 60 tablet, Rfl: 3     lisinopril (ZESTRIL) 10 MG tablet, TAKE 1 TABLET BY MOUTH EVERY EVENING, Disp: , Rfl:      melatonin 1 MG TABS tablet, Take 3 mg by mouth , Disp: , Rfl:      memantine (NAMENDA) 5 MG tablet, Take 1 tablet by mouth 2 times daily, Disp: , Rfl:      metoprolol succinate ER (TOPROL-XL) 50 MG 24 hr tablet, Take 50 mg by mouth, Disp: , Rfl:      multivitamin w/minerals (MULTI-VITAMIN) tablet, Take 1 tablet by mouth, Disp: , Rfl:      senna-docusate (SENOKOT-S/PERICOLACE) 8.6-50 MG tablet, Take 2 tablets by mouth, Disp: , Rfl:      tamsulosin (FLOMAX) 0.4 MG capsule, Take 0.4 mg by mouth, Disp: , Rfl:      vitamin B-12 (CYANOCOBALAMIN) 100 MCG tablet, Take 100 mcg by mouth, Disp: , Rfl:      acetaminophen (TYLENOL) 500 MG tablet, Take 1 tablet by mouth, Disp: , Rfl:      amLODIPine (NORVASC) 10 MG tablet, Take 10 mg by mouth, Disp: , Rfl:      hydrALAZINE (APRESOLINE) 50 MG tablet, Take 50 mg by mouth, Disp: , Rfl:      Lidocaine (LIDOCARE) 4 % Patch,  "Apply to intact skin for upto 12 hrs within 24-hr period., Disp: , Rfl:      Omega-3 Fatty Acids (SUPER OMEGA 3 EPA/DHA PO), Take 1 tablet by mouth, Disp: , Rfl:      saw palmetto 450 MG CAPS capsule, Take 1 tablet by mouth, Disp: , Rfl:       PHYSICAL EXAM     Vital signs  BP (!) 174/98 (BP Location: Right arm, Patient Position: Sitting)   Pulse 53   Ht 1.778 m (5' 10\")   Wt 70.3 kg (155 lb)   BMI 22.24 kg/m      Weight:   155 lbs 0 oz    GENERAL PHYSICAL EXAM: Patient is alert and oriented x 4 in no acute distress. Neck was supple, no carotid bruits, thyromegaly, lymphadenopathy or JVD noted.   NEUROLOGICAL EXAM:  Patient is alert and oriented times 3 he can tell me his name date and place.  He can tell me the name of the president but he struggles with serial 7.  Pupil equal round and reactive face symmetrical tongue midline.  He has right pronator drift.  Strength on the right 5/5 on the left 5/5.  Reflexes 2+ on the right 1+ on the left he has some dysmetria on finger-nose testing he has a wide-based gait and somewhat unstable with decreased arm swing     DIAGNOSTIC STUDIES     PERTINENT RADIOLOGY  Following imaging studies were reviewed:     CT BRAIN 6/13/2020  1.  4.9 x 5.4 x 5.7 cm intraparenchymal hemorrhage centered in the left parietal lobe and extending to the left periatrial region.  There is a small surrounding rim of vasogenic type edema. Consider contrast enhanced brain MRI for further evaluation if   the patient is clinically able to get MRI.  2.  It does have local mass effect with partial effacement of the atrium of the left lateral ventricle. 2.4 mm midline shift to the right at the level of the lateral ventricles.   3.  3.4 mm subdural hematoma along the posterior left frontal convexity.    MRI, MRA brain 2/1/2021  MRI BRAIN:  1. No finding for acute infarct, acute hemorrhage, or mass.     2. Area of chronic encephalomalacic change, hemosiderin staining, and gliosis is seen along the left " frontoparietal junction corresponding to the site of prior parenchymal hemorrhage.  2. Moderate to marked dilatation of the lateral ventricles, stable compared to prior.     MRA [place] [facility]:  1. No vascular cutoff from aneurysm, or flow-limiting stenosis.    MRI BRAIN 12/1/2020  1.  Chronic parenchymal hematoma within the left temporoparietal lobes with encephalomalacia and volume loss. Irregular central T2 hypointensity and enhancement likely relates to scarring, no definite underlying mass. Continued follow-up recommended to   establish longer-term stability. Consider vascular imaging if not previously performed to more definitively exclude vascular etiology of hemorrhage.    MRI BRAIN 8/31/2020  1.  Previously described intraparenchymal hematoma in the left parietal lobe with extension to the left temporoparietal region has markedly decreased in size since previous, now measuring 3.0 x 1.2 cm in AP x transverse dimensions. This primarily   involves the lateral left parietal lobe with slight extension to the left frontoparietal region and is accompanied by hemosiderin staining extending into the left temporoparietal region. While there is a small amount of FLAIR hyperintense diffusion   restriction compatible with acute to very early subacute infarct along the anterior and medial margin of the above-described residual intraparenchymal hemorrhage in the left parietal lobe, this presumably relates to an evolving parenchymal injury from   the above-described intraparenchymal hematoma and is accompanied by a background of adjacent FLAIR hyperintense presumed gliosis. Without contrast, evaluation for an abnormal underlying mass is limited. Follow-up contrast-enhanced brain MRI after   resolution of the above-described residual intraparenchymal hemorrhage will be of benefit to exclude an underlying mass.     2.  Previously described subarachnoid hemorrhage and intraventricular extension of hemorrhage have resolved  in the interim.     3.  Hemosiderin staining in the left parietal lobe and left temporoparietal region is accompanied by a small amount of hemosiderin staining along the atrium and occipital horn of the left lateral ventricle.     4.  Unchanged age-related changes, as above.     MRI BRAIN 6/13/2020  1.  Large left parietal parenchymal hemorrhage again noted. It is grossly unchanged in size. There is new intraventricular extension layering in the occipital horns and left atrium lateral ventricle compared to the prior study. Temporal horns are   slightly more prominent on the prior examination. Close interval follow-up suggested. Overall mass effect and midline shift is similar to the prior examination.     2.  There is multifocal stippled and somewhat nodular enhancement around the periphery of the parietal hemorrhage as detailed above. No definite focal masslike enhancement is identified. This may represent vascular structures and can occasionally be seen   in active bleeding.     3.  Stable thin left convexity subdural hematoma.     4.  Small volume subarachnoid hemorrhage over the right frontal operculum and temporal convexity.     5.  Overall, the constellation of findings is nonspecific. Consider amyloid angiopathy. Given the stable abnormal enhancement, a component of cerebral amyloid angiopathy related inflammation is not excluded though considered less likely.    EEG  6/14/2020  This is a normal EEG during wakefulness and drowsiness except due to mild background dysrhythmia and the EEG being limited due to motion artifact.  EEG is not suggestive of epilepsy.  Further clinical correlation is needed.     PERTINENT LABS  Following labs were reviewed:  2   Ref. Range 8/31/2020 09:06   Levetiracetam Latest Ref Range: 6.0 - 46.0 ug/mL 24.6                  Total time spent for face to face visit, reviewing labs/imaging studies, counseling and coordination of care was: 30 Minutes spent on the date of the encounter  doing chart review, review of outside records, review of test results, interpretation of tests, patient visit, documentation and discussion with family       This note was dictated using voice recognition software.  Any grammatical or context distortions are unintentional and inherent to the software.             Again, thank you for allowing me to participate in the care of your patient.        Sincerely,        Tom Rivera MD

## 2021-02-08 NOTE — PROGRESS NOTES
NEUROLOGY FOLLOW UP VISIT  NOTE       [facility] [place]  [address] [place] [address]  Tel: [phone]  Fax: [phone]  [email or web address]     John Dueñas,  1945, MRN [id number]  PCP: Malcolm Sun, None  Date: 2021     ASSESSMENT & PLAN     Diagnosis code  Nontraumatic subcortical hemorrhage of left cerebral hemisphere (H)     Left parietal intracerebral hemorrhage with extension into the ventricle  75 years old male with history of HTN, HLD who was [place] [facility] [place] confusion and speech difficulty.  He was noted to have left parietal temporal intracerebral hemorrhage with extension into the ventricle.  Afterwards he had serial MRI scan and most recent MRI and MRA shows encephalomalacia but no vascular abnormality.  He was started on Namenda by rehab physician and currently is in the process of tapering himself off Namenda.  I have recommended checking EEG and if normal, I will taper him off Keppra also.  Follow-up will be the day he gets EEG.    Cognitive decline  Wife is concerned since the hemorrhage he has difficulty with certain words and struggles with memory.  He has trouble with conceptualizing time.  He had a neuropsychological testing done in 2019 at [facility] that was normal and I have recommended repeating neuropsychological testing and follow-up afterwards.    Thank you again for this referral, please feel free to contact me if you have any questions.    Tom Rviera MD  [facility]  (Formerly, [facility]     HISTORY OF PRESENT ILLNESS     Patient is a 75 years old male with no significant past medical history was admitted to the hospital in 2020 with acute confusion and some speech difficulty.  He had a CT of the head that showed left parietal intracerebral hemorrhage with extension into the ventricle.  Follow-up scan showed slight decrease but  the size and location of hemorrhage raise the possibility of amyloid angiopathy.  Subsequently MRI of the brain was done that showed changes that raise the possibility of amyloid angiopathy and a follow-up scan was recommended.  He had a follow-up MRI in December 2020 that showed area of chronic encephalomalacia due to prior hemorrhage but no vascular abnormality was noted. Patient was interested in coming off Keppra but due to hemorrhage noted on his MRI scan decision was made to continue it.  I should note patient was started on Namenda during rehab and recently talked to his rehab physician and is in the process of tapering himself off Namenda.  Wife reports he still struggles with words and has difficulty with time.  Strength wise he has improved significantly.  There is no history of unexplained loss of consciousness or any tonic-clonic activity.     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    Surgical History:  He  has a past surgical history that includes tonsillectomy.     SOCIAL HISTORY     Reviewed, and he  reports that he has never smoked. He has never used smokeless tobacco. He reports previous alcohol use.     FAMILY HISTORY     Reviewed, and family history includes Chronic Obstructive Pulmonary Disease in his father; Diabetes in his maternal grandfather; Heart Disease in his brother; Multiple myeloma in his mother.     ALLERGIES     Allergies   Allergen Reactions     Ether Other (See Comments)     Lactose Other (See Comments)          REVIEW OF SYSTEMS     A 12 point review of system was performed and was negative except as outlined in the history of present illness.     HOME MEDICATIONS       Current Outpatient Medications:      calcium citrate-vitamin D (CITRACAL) 315-250 MG-UNIT TABS per tablet, Take 2 tablets by mouth, Disp: , Rfl:      cholecalciferol (VITAMIN D-1000 MAX ST) 25 MCG (1000 UT) TABS, Take 1,000 Units by mouth, Disp: , Rfl:      escitalopram (LEXAPRO) 5 MG tablet, Take 5 mg by mouth At Bedtime, Disp: , Rfl:      Glucosamine-Chondroit-Vit C-Mn (GLUCOSAMINE CHONDROITINOITIN COMPLEX) CAPS, , Disp: , Rfl:      levETIRAcetam (KEPPRA) 500 MG tablet, Take 1 tablet (500 mg) by mouth 2 times daily, Disp: 60 tablet, Rfl: 3     lisinopril (ZESTRIL) 10 MG tablet, TAKE 1 TABLET BY MOUTH EVERY EVENING, Disp: , Rfl:      melatonin 1 MG TABS tablet, Take 3 mg by mouth , Disp: , Rfl:      memantine (NAMENDA) 5 MG tablet, Take 1 tablet by mouth 2 times daily, Disp: , Rfl:      metoprolol succinate ER (TOPROL-XL) 50 MG 24 hr tablet, Take 50 mg by mouth, Disp: , Rfl:      multivitamin w/minerals (MULTI-VITAMIN) tablet, Take 1 tablet by mouth, Disp: , Rfl:      senna-docusate (SENOKOT-S/PERICOLACE) 8.6-50 MG tablet, Take 2 tablets by mouth, Disp: , Rfl:      tamsulosin (FLOMAX) 0.4 MG capsule, Take 0.4 mg by mouth, Disp: , Rfl:      vitamin B-12 (CYANOCOBALAMIN) 100 MCG tablet, Take 100 mcg by mouth, Disp: , Rfl:      acetaminophen (TYLENOL) 500 MG tablet, Take 1 tablet by mouth, Disp: , Rfl:      amLODIPine (NORVASC) 10 MG tablet, Take 10 mg by mouth, Disp: , Rfl:      hydrALAZINE (APRESOLINE) 50 MG tablet, Take 50 mg by mouth, Disp: , Rfl:      Lidocaine (LIDOCARE) 4 % Patch, Apply to intact skin for upto 12 hrs within 24-hr period., Disp: , Rfl:      Omega-3 Fatty Acids (SUPER OMEGA 3 EPA/DHA PO), Take 1 tablet by mouth, Disp: , Rfl:      saw palmetto 450 MG CAPS capsule, Take 1 tablet by mouth, Disp: , Rfl:       PHYSICAL EXAM     Vital  "signs  BP (!) 174/98 (BP Location: Right arm, Patient Position: Sitting)   Pulse 53   Ht 1.778 m (5' 10\")   Wt 70.3 kg (155 lb)   BMI 22.24 kg/m      Weight:   155 lbs 0 oz    GENERAL PHYSICAL EXAM: Patient is alert and oriented x 4 in no acute distress. Neck was supple, no carotid bruits, thyromegaly, lymphadenopathy or JVD noted.   NEUROLOGICAL EXAM:  Patient is alert and oriented times 3 he can tell me his name date and place.  He can tell me the name of the president but he struggles with serial 7.  Pupil equal round and reactive face symmetrical tongue midline.  He has right pronator drift.  Strength on the right 5/5 on the left 5/5.  Reflexes 2+ on the right 1+ on the left he has some dysmetria on finger-nose testing he has a wide-based gait and somewhat unstable with decreased arm swing     DIAGNOSTIC STUDIES     PERTINENT RADIOLOGY  Following imaging studies were reviewed:     CT BRAIN 6/13/2020  1.  4.9 x 5.4 x 5.7 cm intraparenchymal hemorrhage centered in the left parietal lobe and extending to the left periatrial region.  There is a small surrounding rim of vasogenic type edema. Consider contrast enhanced brain MRI for further evaluation if   the patient is clinically able to get MRI.  2.  It does have local mass effect with partial effacement of the atrium of the left lateral ventricle. 2.4 mm midline shift to the right at the level of the lateral ventricles.   3.  3.4 mm subdural hematoma along the posterior left frontal convexity.    MRI, MRA brain 2/1/2021  MRI BRAIN:  1. No finding for acute infarct, acute hemorrhage, or mass.     2. Area of chronic encephalomalacic change, hemosiderin staining, and gliosis is seen along the left frontoparietal junction corresponding to the site of prior parenchymal hemorrhage.  2. Moderate to marked dilatation of the lateral ventricles, stable compared to prior.     MRA [facility]:  1. No vascular cutoff from aneurysm, or flow-limiting stenosis.    MRI " BRAIN 12/1/2020  1.  Chronic parenchymal hematoma within the left temporoparietal lobes with encephalomalacia and volume loss. Irregular central T2 hypointensity and enhancement likely relates to scarring, no definite underlying mass. Continued follow-up recommended to   establish longer-term stability. Consider vascular imaging if not previously performed to more definitively exclude vascular etiology of hemorrhage.    MRI BRAIN 8/31/2020  1.  Previously described intraparenchymal hematoma in the left parietal lobe with extension to the left temporoparietal region has markedly decreased in size since previous, now measuring 3.0 x 1.2 cm in AP x transverse dimensions. This primarily   involves the lateral left parietal lobe with slight extension to the left frontoparietal region and is accompanied by hemosiderin staining extending into the left temporoparietal region. While there is a small amount of FLAIR hyperintense diffusion   restriction compatible with acute to very early subacute infarct along the anterior and medial margin of the above-described residual intraparenchymal hemorrhage in the left parietal lobe, this presumably relates to an evolving parenchymal injury from   the above-described intraparenchymal hematoma and is accompanied by a background of adjacent FLAIR hyperintense presumed gliosis. Without contrast, evaluation for an abnormal underlying mass is limited. Follow-up contrast-enhanced brain MRI after   resolution of the above-described residual intraparenchymal hemorrhage will be of benefit to exclude an underlying mass.     2.  Previously described subarachnoid hemorrhage and intraventricular extension of hemorrhage have resolved in the interim.     3.  Hemosiderin staining in the left parietal lobe and left temporoparietal region is accompanied by a small amount of hemosiderin staining along the atrium and occipital horn of the left lateral ventricle.     4.  Unchanged age-related changes, as  above.     MRI BRAIN 6/13/2020  1.  Large left parietal parenchymal hemorrhage again noted. It is grossly unchanged in size. There is new intraventricular extension layering in the occipital horns and left atrium lateral ventricle compared to the prior study. Temporal horns are   slightly more prominent on the prior examination. Close interval follow-up suggested. Overall mass effect and midline shift is similar to the prior examination.     2.  There is multifocal stippled and somewhat nodular enhancement around the periphery of the parietal hemorrhage as detailed above. No definite focal masslike enhancement is identified. This may represent vascular structures and can occasionally be seen   in active bleeding.     3.  Stable thin left convexity subdural hematoma.     4.  Small volume subarachnoid hemorrhage over the right frontal operculum and temporal convexity.     5.  Overall, the constellation of findings is nonspecific. Consider amyloid angiopathy. Given the stable abnormal enhancement, a component of cerebral amyloid angiopathy related inflammation is not excluded though considered less likely.    EEG  6/14/2020  This is a normal EEG during wakefulness and drowsiness except due to mild background dysrhythmia and the EEG being limited due to motion artifact.  EEG is not suggestive of epilepsy.  Further clinical correlation is needed.     PERTINENT LABS  Following labs were reviewed:  2   Ref. Range 8/31/2020 09:06   Levetiracetam Latest Ref Range: 6.0 - 46.0 ug/mL 24.6                  Total time spent for face to face visit, reviewing labs/imaging studies, counseling and coordination of care was: 30 Minutes spent on the date of the encounter doing chart review, review of outside records, review of test results, interpretation of tests, patient visit, documentation and discussion with family     This note was dictated using voice recognition software.  Any grammatical or context distortions are unintentional  and inherent to the software.

## 2021-02-16 ENCOUNTER — TELEPHONE (OUTPATIENT)
Dept: NEUROLOGY | Facility: CLINIC | Age: 76
End: 2021-02-16

## 2021-02-16 NOTE — TELEPHONE ENCOUNTER
Pt's wife called re need for neuropsych. Pt had OT/PT yesterday ( see Care Everywhere for [facility]  notes ). It was recommended to reconsider the neuropsych testing. OT/PT thought, given pt's limitation, he may not get an accurate result. Please advise. [phone]

## 2021-04-01 ENCOUNTER — RECORDS - HEALTHEAST (OUTPATIENT)
Dept: ADMINISTRATIVE | Facility: OTHER | Age: 76
End: 2021-04-01

## 2021-04-01 ENCOUNTER — HOSPITAL ENCOUNTER (OUTPATIENT)
Dept: NEUROLOGY | Facility: CLINIC | Age: 76
Setting detail: THERAPIES SERIES
Discharge: STILL ADMITTED OR ADMINISTRATIVE | End: 2021-04-01
Attending: CLINICAL NEUROPSYCHOLOGIST

## 2021-04-01 DIAGNOSIS — F01.C0 MAJOR NEUROCOGNITIVE DISORDER DUE TO VASCULAR DISEASE, WITHOUT BEHAVIORAL DISTURBANCE, SEVERE (H): ICD-10-CM

## 2021-04-13 DIAGNOSIS — I61.6 MULTIPLE LEFT-SIDED NONTRAUMATIC LOCALIZED INTRACEREBRAL HEMORRHAGES (H): ICD-10-CM

## 2021-04-13 RX ORDER — LEVETIRACETAM 500 MG/1
500 TABLET ORAL 2 TIMES DAILY
Qty: 60 TABLET | Refills: 0 | Status: SHIPPED | OUTPATIENT
Start: 2021-04-13 | End: 2021-04-19

## 2021-04-13 NOTE — TELEPHONE ENCOUNTER
Refill request for Keppra  Follow up is scheduled for 4/19/21 to discuss tapering  Medication T'd for review and signature  Aura Felipe CMA on 4/13/2021 at 11:16 AM

## 2021-04-15 ENCOUNTER — HOSPITAL ENCOUNTER (OUTPATIENT)
Dept: NEUROLOGY | Facility: CLINIC | Age: 76
Setting detail: THERAPIES SERIES
Discharge: STILL ADMITTED OR ADMINISTRATIVE | End: 2021-04-15
Attending: CLINICAL NEUROPSYCHOLOGIST

## 2021-04-15 DIAGNOSIS — I62.9 INTRACRANIAL HEMORRHAGE (H): ICD-10-CM

## 2021-04-15 DIAGNOSIS — S06.5XAA SUBDURAL HEMATOMA (H): ICD-10-CM

## 2021-04-15 DIAGNOSIS — F43.23 ADJUSTMENT DISORDER WITH MIXED ANXIETY AND DEPRESSED MOOD: ICD-10-CM

## 2021-04-15 DIAGNOSIS — R47.01 APHASIA: ICD-10-CM

## 2021-04-15 DIAGNOSIS — I60.9 SAH (SUBARACHNOID HEMORRHAGE) (H): ICD-10-CM

## 2021-04-15 DIAGNOSIS — F01.C0 MAJOR NEUROCOGNITIVE DISORDER DUE TO VASCULAR DISEASE, WITHOUT BEHAVIORAL DISTURBANCE, SEVERE (H): ICD-10-CM

## 2021-04-15 DIAGNOSIS — I61.1 NONTRAUMATIC CORTICAL HEMORRHAGE OF LEFT CEREBRAL HEMISPHERE (H): ICD-10-CM

## 2021-04-19 ENCOUNTER — OFFICE VISIT (OUTPATIENT)
Dept: NEUROLOGY | Facility: CLINIC | Age: 76
End: 2021-04-19
Attending: PSYCHIATRY & NEUROLOGY
Payer: COMMERCIAL

## 2021-04-19 VITALS
BODY MASS INDEX: 21.82 KG/M2 | SYSTOLIC BLOOD PRESSURE: 156 MMHG | HEIGHT: 70 IN | DIASTOLIC BLOOD PRESSURE: 84 MMHG | HEART RATE: 54 BPM | WEIGHT: 152.4 LBS

## 2021-04-19 DIAGNOSIS — I61.0 NONTRAUMATIC SUBCORTICAL HEMORRHAGE OF LEFT CEREBRAL HEMISPHERE (H): Primary | ICD-10-CM

## 2021-04-19 DIAGNOSIS — R41.89 COGNITIVE DECLINE: ICD-10-CM

## 2021-04-19 DIAGNOSIS — F01.50 VASCULAR DEMENTIA WITHOUT BEHAVIORAL DISTURBANCE (H): ICD-10-CM

## 2021-04-19 DIAGNOSIS — I61.0 NONTRAUMATIC SUBCORTICAL HEMORRHAGE OF LEFT CEREBRAL HEMISPHERE (H): ICD-10-CM

## 2021-04-19 PROBLEM — S06.5XAA SUBDURAL HEMATOMA (H): Status: RESOLVED | Noted: 2020-06-24 | Resolved: 2021-04-19

## 2021-04-19 PROBLEM — F41.8 DEPRESSION WITH ANXIETY: Status: ACTIVE | Noted: 2021-04-09

## 2021-04-19 PROBLEM — I62.9 INTRACRANIAL HEMORRHAGE (H): Status: RESOLVED | Noted: 2020-08-06 | Resolved: 2021-04-19

## 2021-04-19 PROBLEM — I60.9 SAH (SUBARACHNOID HEMORRHAGE) (H): Status: RESOLVED | Noted: 2020-06-14 | Resolved: 2021-04-19

## 2021-04-19 PROBLEM — H25.813 MIXED TYPE AGE-RELATED CATARACT, BOTH EYES: Status: ACTIVE | Noted: 2020-12-17

## 2021-04-19 PROCEDURE — 99214 OFFICE O/P EST MOD 30 MIN: CPT | Performed by: PSYCHIATRY & NEUROLOGY

## 2021-04-19 RX ORDER — DONEPEZIL HYDROCHLORIDE 5 MG/1
5 TABLET, FILM COATED ORAL AT BEDTIME
Qty: 30 TABLET | Refills: 1 | Status: SHIPPED | OUTPATIENT
Start: 2021-04-19 | End: 2021-06-07

## 2021-04-19 ASSESSMENT — MIFFLIN-ST. JEOR: SCORE: 1427.53

## 2021-04-19 NOTE — NURSING NOTE
Chief Complaint   Patient presents with     Left parietal intracerebral hemorrhage with extension into t     Discuss EEG and neuropsych results      Cognitive Decline     Aura Felipe CMA on 4/19/2021 at 10:15 AM

## 2021-04-19 NOTE — LETTER
2021         RE: John Dueñas  [address]  [facility] [address]        Dear Colleague,    Thank you for referring your patient, John Dueñas, to the [facility] [place]. Please see a copy of my visit note below.    NEUROLOGY FOLLOW UP VISIT  NOTE       [facility] [place]  [address] [place] [address]  Tel: [phone]  Fax: [phone]  [email or web address]     John Dueñas,  1945, MRN [id number]  PCP: Malcolm Sun  Date: 2021      ASSESSMENT & PLAN     Diagnosis code  1. Vascular dementia  2. Left intracranial hemorrhage     Left parietal intracerebral hemorrhage with extension into ventricle  75 years old male with history of HTN, HLD who was admitted to [facility] in 2020 with confusion and speech difficulty.  He had left parietal temporal ICH with extension into the ventricles.  This has gradually resolved and changes on subsequent MRI scan raise the possibility of amyloid angiopathy.  In rehab he was started on Namenda that was gradually discontinued.  He was on Keppra for seizure prophylaxis and has remained seizure-free.  His EEG today shows nonspecific slowing more so on the left side but no sharp activity.  I have asked him to decrease Keppra to 250 mg twice daily for 1 week and then to 250 mg once a day for 1 week and then discontinue.    Vascular dementia  Wife is concerned that since hemorrhage the patient had difficulty with his speech and also struggled with memory.  He had a neuropsychological testing done in  and a repeat done recently suggests major neurocognitive disorder likely vascular.  Previously he had lab work that included normal TSH and B12.  I have recommended checking folate, methylmalonic acid level, vitamin B1, RPR and started him on Aricept 5 mg daily.  Before starting Aricept we will check hepatic profile.  Follow-up will be in 2 months and if he is able to tolerate  I will increase the dose of Aricept to 10 mg daily    Thank you again for this referral, please feel free to contact me if you have any questions.    Tom Rivera MD  [facility]  (Formerly, [facility]     HISTORY OF PRESENT ILLNESS     Patient is a 75 years old male without significant past medical history who was admitted to hospital in June 2020 with acute confusion and speech difficulty.  He had a CT of the head that showed left parietal ICH with extension into the ventricles.  The size and location of his hemorrhage raise the possibility of amyloid angiopathy.  MRI of the brain again was suspicious for amyloid angiopathy and most recent MRI done in December 2020 showed area of chronic encephalomalacia due to prior hemorrhage but no vascular abnormality.  He was in rehab facility and was started on Namenda and was in the process of tapering himself off Namenda.  Wife reported that he was still struggling with words and had difficulty with time.  His strength had improved.  I recommended an EEG that was done earlier today and showed diffuse slowing more so on the left side due to known intracerebral hemorrhage.  No sharp activity or rhythmic discharges is noted to suggest seizure.  Due to his cognitive issues I had also recommended getting a neuropsychological testing that did confirm major vascular neurocognitive disorder along with mixed anxiety and depressed mood.  Since his last visit wife reports patient had neuropsychological testing done and although the report suggests major neurocognitive disorder likely vascular they were on the impression his neuropsychological testing was normal.  Previously he had TSH and vitamin B12 level checked that was normal.  Patient denies any visual hallucination or any gait instability     PROBLEM LIST   Patient Active Problem List   Diagnosis Code     Hypercholesteremia E78.00     Intracerebral hemorrhage,  nontraumatic (H) I61.9     Lumbar disc herniation with radiculopathy M51.16     Myopia H52.10     Impaired fasting glucose R73.01     Malignant essential hypertension I10     Advance care planning Z71.89     Depression with anxiety F41.8     Mixed type age-related cataract, both eyes H25.813     Vascular dementia (H) F01.50         PAST MEDICAL & SURGICAL HISTORY     Past Medical History:   Patient  has a past medical history of Intracranial hemorrhage (H) (8/6/2020), SAH (subarachnoid hemorrhage) (H) (6/14/2020), and Subdural hematoma (H) (6/24/2020).    Surgical History:  He  has a past surgical history that includes tonsillectomy.     SOCIAL HISTORY     Reviewed, and he  reports that he has never smoked. He has never used smokeless tobacco. He reports previous alcohol use.     FAMILY HISTORY     Reviewed, and family history includes Chronic Obstructive Pulmonary Disease in his father; Diabetes in his maternal grandfather; Heart Disease in his brother; Multiple myeloma in his mother.     ALLERGIES     Allergies   Allergen Reactions     Ether Other (See Comments)     Lactose Other (See Comments)         REVIEW OF SYSTEMS     A 12 point review of system was performed and was negative except as outlined in the history of present illness.     HOME MEDICATIONS     Current Outpatient Rx   Medication Sig Dispense Refill     acetaminophen (TYLENOL) 500 MG tablet Take 1 tablet by mouth       calcium citrate-vitamin D (CITRACAL) 315-250 MG-UNIT TABS per tablet Take 2 tablets by mouth       cholecalciferol (VITAMIN D-1000 MAX ST) 25 MCG (1000 UT) TABS Take 1,000 Units by mouth       donepezil (ARICEPT) 5 MG tablet Take 1 tablet (5 mg) by mouth At Bedtime 30 tablet 1     escitalopram (LEXAPRO) 10 MG tablet Take 10 mg by mouth At Bedtime        Glucosamine-Chondroit-Vit C-Mn (GLUCOSAMINE CHONDROITINOITIN COMPLEX) CAPS        lisinopril (ZESTRIL) 10 MG tablet TAKE 1 TABLET BY MOUTH EVERY EVENING       melatonin 5 MG tablet     "    metoprolol succinate ER (TOPROL-XL) 50 MG 24 hr tablet Take 50 mg by mouth       multivitamin w/minerals (MULTI-VITAMIN) tablet Take 1 tablet by mouth       senna-docusate (SENOKOT-S/PERICOLACE) 8.6-50 MG tablet Take 2 tablets by mouth       tamsulosin (FLOMAX) 0.4 MG capsule Take 0.4 mg by mouth       vitamin B-12 (CYANOCOBALAMIN) 1000 MCG tablet Take 1,000 mcg by mouth            PHYSICAL EXAM     Vital signs  BP (!) 156/84 (BP Location: Right arm, Patient Position: Sitting)   Pulse 54   Ht 1.778 m (5' 10\")   Wt 69.1 kg (152 lb 6.4 oz)   BMI 21.87 kg/m      Weight:   152 lbs 6.4 oz    GENERAL PHYSICAL EXAM: Patient is alert and oriented x 4 in no acute distress. Neck was supple, no carotid bruits, thyromegaly, lymphadenopathy or JVD noted.   NEUROLOGICAL EXAM:  Patient is alert and oriented times 3 he can tell me his name date and place.  He can tell me the name of the president but he struggles with serial 7.  Pupil equal round and reactive face symmetrical tongue midline.  He has right pronator drift.  Strength on the right 5/5 on the left 5/5.  Reflexes 2+ on the right 1+ on the left he has some dysmetria on finger-nose testing he has a wide-based gait and somewhat unstable with decreased arm swing     DIAGNOSTIC STUDIES     PERTINENT RADIOLOGY  Following imaging studies were reviewed:     CT BRAIN 6/13/2020  1.  4.9 x 5.4 x 5.7 cm intraparenchymal hemorrhage centered in the left parietal lobe and extending to the left periatrial region.  There is a small surrounding rim of vasogenic type edema. Consider contrast enhanced brain MRI for further evaluation if   the patient is clinically able to get MRI.  2.  It does have local mass effect with partial effacement of the atrium of the left lateral ventricle. 2.4 mm midline shift to the right at the level of the lateral ventricles.   3.  3.4 mm subdural hematoma along the posterior left frontal convexity.     MRI, MRA brain 2/1/2021  MRI BRAIN:  1. No " finding for acute infarct, acute hemorrhage, or mass.     2. Area of chronic encephalomalacic change, hemosiderin staining, and gliosis is seen along the left frontoparietal junction corresponding to the site of prior parenchymal hemorrhage.  2. Moderate to marked dilatation of the lateral ventricles, stable compared to prior.     MRA [facility]:  1. No vascular cutoff from aneurysm, or flow-limiting stenosis.     MRI BRAIN 12/1/2020  1.  Chronic parenchymal hematoma within the left temporoparietal lobes with encephalomalacia and volume loss. Irregular central T2 hypointensity and enhancement likely relates to scarring, no definite underlying mass. Continued follow-up recommended to  establish longer-term stability. Consider vascular imaging if not previously performed to more definitively exclude vascular etiology of hemorrhage.     MRI BRAIN 8/31/2020  1.  Previously described intraparenchymal hematoma in the left parietal lobe with extension to the left temporoparietal region has markedly decreased in size since previous, now measuring 3.0 x 1.2 cm in AP x transverse dimensions. This primarily   involves the lateral left parietal lobe with slight extension to the left frontoparietal region and is accompanied by hemosiderin staining extending into the left temporoparietal region. While there is a small amount of FLAIR hyperintense diffusion   restriction compatible with acute to very early subacute infarct along the anterior and medial margin of the above-described residual intraparenchymal hemorrhage in the left parietal lobe, this presumably relates to an evolving parenchymal injury from   the above-described intraparenchymal hematoma and is accompanied by a background of adjacent FLAIR hyperintense presumed gliosis. Without contrast, evaluation for an abnormal underlying mass is limited. Follow-up contrast-enhanced brain MRI after   resolution of the above-described residual intraparenchymal hemorrhage  will be of benefit to exclude an underlying mass.     2.  Previously described subarachnoid hemorrhage and intraventricular extension of hemorrhage have resolved in the interim.     3.  Hemosiderin staining in the left parietal lobe and left temporoparietal region is accompanied by a small amount of hemosiderin staining along the atrium and occipital horn of the left lateral ventricle.     4.  Unchanged age-related changes, as above.     MRI BRAIN 6/13/2020  1.  Large left parietal parenchymal hemorrhage again noted. It is grossly unchanged in size. There is new intraventricular extension layering in the occipital horns and left atrium lateral ventricle compared to the prior study. Temporal horns are   slightly more prominent on the prior examination. Close interval follow-up suggested. Overall mass effect and midline shift is similar to the prior examination.     2.  There is multifocal stippled and somewhat nodular enhancement around the periphery of the parietal hemorrhage as detailed above. No definite focal masslike enhancement is identified. This may represent vascular structures and can occasionally be seen   in active bleeding.     3.  Stable thin left convexity subdural hematoma.     4.  Small volume subarachnoid hemorrhage over the right frontal operculum and temporal convexity.     5.  Overall, the constellation of findings is nonspecific. Consider amyloid angiopathy. Given the stable abnormal enhancement, a component of cerebral amyloid angiopathy related inflammation is not excluded though considered less likely.     EEG 4/19/2021  Diffusely slow more so on the left side.  No sharp activity arrhythmic discharges seen to suggest seizures    EEG  6/14/2020  This is a normal EEG during wakefulness and drowsiness except due to mild background dysrhythmia and the EEG being limited due to motion artifact.  EEG is not suggestive of epilepsy.  Further clinical correlation is needed.    NEUROPSYCHOLOGICAL  EVALUATION 4/1/2021  Major Vascular Neurocognitive Disorder     Adjustment Disorder with Mixed Anxiety and Depressed Mood     RECOMMENDATIONS:  1) Continued neurologic follow up is recommended for ongoing monitoring and treatment. Consider a trial of a cholinesterase inhibitor (e.g., donepezil) if not medically contraindicated, as this medication has been shown to benefit individuals with vascular cognitive impairment.     2) Ongoing participation in speech therapy (cognitive rehabilitation) is strongly encouraged. The patient appears to be benefiting from this in many ways.     3) Additional emotional support is also strongly recommended. I have provided Mr. Dueñas and his wife with various support groups in the community (for survivors and/or loved ones). I have also discussed with him a referral to see our psychologist for individual psychotherapy to help him process, cope, and even come to accept some of the losses he has endured as a result of his CVAs. Mr. Dueñas is not interested in either of these suggestions at this time, but asked that I mail him these resources in case he changes his mind. He does get some additional support from his Taoist.     4) Consider an adjustment to his antidepressant medication regimen (either increasing the dosage or trying an alternative) to help better manage his emotional symptoms.     5) Mr. Dueñas is encouraged to adhere closely to his medication regimen in general, in order to keep his cerebrovascular risk factors (e.g., high blood pressure and high cholesterol) well managed. This may help to prevent future CVAs and additional cognitive decline.     6) If not already done, completion of paperwork for advance directives and assignment of healthcare and financial Power of  should be considered at this time.     7) Mr. Dueñas is in a living situation in which most of his complex daily activities (such as driving and financial management) are arranged and managed for him.  This all remains appropriate. Thus, he is quite fortunate in that, despite the evidence of cognitive impairment, his current independent living situation appears to be working well for him.     8) Mr. Dueñas is encouraged to remain physically, socially, and mentally active in order to optimize his brain health.     9) Neuropsychological follow-up is recommended in approximately 12 months (or as clinically indicated) in order to monitor his cognitive status and update recommendations. The current test data can be used as a baseline to which future comparisons can be made.     PERTINENT LABS  Following labs were reviewed:     Ref. Range 8/31/2020 09:06   Levetiracetam Latest Ref Range: 6.0 - 46.0 ug/mL 24.6                 Total time spent for face to face visit, reviewing labs/imaging studies, counseling and coordination of care was: 30 Minutes spent on the date of the encounter doing chart review, review of outside records, review of test results, interpretation of tests, patient visit and documentation     Orders Placed This Encounter   Procedures     Folate     Methylmalonic Acid     Vitamin B1 (Thiamine), Whole Blood ([facility])     Treponema Abs w Reflex to RPR and Titer     Hepatic panel         This note was dictated using voice recognition software.  Any grammatical or context distortions are unintentional and inherent to the software.             Again, thank you for allowing me to participate in the care of your patient.        Sincerely,        Tom Rivera MD

## 2021-04-19 NOTE — PROGRESS NOTES
NEUROLOGY FOLLOW UP VISIT  NOTE       [facility]UROLOGY [place]  [address] [place] [address]  Tel: [phone]  Fax: [phone]  [email or web address]     John Dueñas,  1945, MRN [id number]  PCP: Malcolm Sun  Date: 2021      ASSESSMENT & PLAN     Diagnosis code  Vascular dementia  Left intracranial hemorrhage     Left parietal intracerebral hemorrhage with extension into ventricle  75 years old male with history of HTN, HLD who was admitted to [facility] in 2020 with confusion and speech difficulty.  He had left parietal temporal ICH with extension into the ventricles.  This has gradually resolved and changes on subsequent MRI scan raise the possibility of amyloid angiopathy.  In rehab he was started on Namenda that was gradually discontinued.  He was on Keppra for seizure prophylaxis and has remained seizure-free.  His EEG today shows nonspecific slowing more so on the left side but no sharp activity.  I have asked him to decrease Keppra to 250 mg twice daily for 1 week and then to 250 mg once a day for 1 week and then discontinue.    Vascular dementia  Wife is concerned that since hemorrhage the patient had difficulty with his speech and also struggled with memory.  He had a neuropsychological testing done in 2019 and a repeat done recently suggests major neurocognitive disorder likely vascular.  Previously he had lab work that included normal TSH and B12.  I have recommended checking folate, methylmalonic acid level, vitamin B1, RPR and started him on Aricept 5 mg daily.  Before starting Aricept we will check hepatic profile.  Follow-up will be in 2 months and if he is able to tolerate I will increase the dose of Aricept to 10 mg daily    Thank you again for this referral, please feel free to contact me if you have any questions.    Tom Rivera MD  [facility]  (Formerly, [facility]      HISTORY OF PRESENT ILLNESS     Patient is a 75 years old male without significant past medical history who was admitted to hospital in June 2020 with acute confusion and speech difficulty.  He had a CT of the head that showed left parietal ICH with extension into the ventricles.  The size and location of his hemorrhage raise the possibility of amyloid angiopathy.  MRI of the brain again was suspicious for amyloid angiopathy and most recent MRI done in December 2020 showed area of chronic encephalomalacia due to prior hemorrhage but no vascular abnormality.  He was in rehab facility and was started on Namenda and was in the process of tapering himself off Namenda.  Wife reported that he was still struggling with words and had difficulty with time.  His strength had improved.  I recommended an EEG that was done earlier today and showed diffuse slowing more so on the left side due to known intracerebral hemorrhage.  No sharp activity or rhythmic discharges is noted to suggest seizure.  Due to his cognitive issues I had also recommended getting a neuropsychological testing that did confirm major vascular neurocognitive disorder along with mixed anxiety and depressed mood.  Since his last visit wife reports patient had neuropsychological testing done and although the report suggests major neurocognitive disorder likely vascular they were on the impression his neuropsychological testing was normal.  Previously he had TSH and vitamin B12 level checked that was normal.  Patient denies any visual hallucination or any gait instability     PROBLEM LIST   Patient Active Problem List   Diagnosis Code     Hypercholesteremia E78.00     Intracerebral hemorrhage, nontraumatic (H) I61.9     Lumbar disc herniation with radiculopathy M51.16     Myopia H52.10     Impaired fasting glucose R73.01     Malignant essential hypertension I10     Advance care planning Z71.89     Depression with anxiety F41.8     Mixed type age-related  cataract, both eyes H25.813     Vascular dementia (H) F01.50         PAST MEDICAL & SURGICAL HISTORY     Past Medical History:   Patient  has a past medical history of Intracranial hemorrhage (H) (8/6/2020), SAH (subarachnoid hemorrhage) (H) (6/14/2020), and Subdural hematoma (H) (6/24/2020).    Surgical History:  He  has a past surgical history that includes tonsillectomy.     SOCIAL HISTORY     Reviewed, and he  reports that he has never smoked. He has never used smokeless tobacco. He reports previous alcohol use.     FAMILY HISTORY     Reviewed, and family history includes Chronic Obstructive Pulmonary Disease in his father; Diabetes in his maternal grandfather; Heart Disease in his brother; Multiple myeloma in his mother.     ALLERGIES     Allergies   Allergen Reactions     Ether Other (See Comments)     Lactose Other (See Comments)         REVIEW OF SYSTEMS     A 12 point review of system was performed and was negative except as outlined in the history of present illness.     HOME MEDICATIONS     Current Outpatient Rx   Medication Sig Dispense Refill     acetaminophen (TYLENOL) 500 MG tablet Take 1 tablet by mouth       calcium citrate-vitamin D (CITRACAL) 315-250 MG-UNIT TABS per tablet Take 2 tablets by mouth       cholecalciferol (VITAMIN D-1000 MAX ST) 25 MCG (1000 UT) TABS Take 1,000 Units by mouth       donepezil (ARICEPT) 5 MG tablet Take 1 tablet (5 mg) by mouth At Bedtime 30 tablet 1     escitalopram (LEXAPRO) 10 MG tablet Take 10 mg by mouth At Bedtime        Glucosamine-Chondroit-Vit C-Mn (GLUCOSAMINE CHONDROITINOITIN COMPLEX) CAPS        lisinopril (ZESTRIL) 10 MG tablet TAKE 1 TABLET BY MOUTH EVERY EVENING       melatonin 5 MG tablet        metoprolol succinate ER (TOPROL-XL) 50 MG 24 hr tablet Take 50 mg by mouth       multivitamin w/minerals (MULTI-VITAMIN) tablet Take 1 tablet by mouth       senna-docusate (SENOKOT-S/PERICOLACE) 8.6-50 MG tablet Take 2 tablets by mouth       tamsulosin (FLOMAX)  "0.4 MG capsule Take 0.4 mg by mouth       vitamin B-12 (CYANOCOBALAMIN) 1000 MCG tablet Take 1,000 mcg by mouth            PHYSICAL EXAM     Vital signs  BP (!) 156/84 (BP Location: Right arm, Patient Position: Sitting)   Pulse 54   Ht 1.778 m (5' 10\")   Wt 69.1 kg (152 lb 6.4 oz)   BMI 21.87 kg/m      Weight:   152 lbs 6.4 oz    GENERAL PHYSICAL EXAM: Patient is alert and oriented x 4 in no acute distress. Neck was supple, no carotid bruits, thyromegaly, lymphadenopathy or JVD noted.   NEUROLOGICAL EXAM:  Patient is alert and oriented times 3 he can tell me his name date and place.  He can tell me the name of the president but he struggles with serial 7.  Pupil equal round and reactive face symmetrical tongue midline.  He has right pronator drift.  Strength on the right 5/5 on the left 5/5.  Reflexes 2+ on the right 1+ on the left he has some dysmetria on finger-nose testing he has a wide-based gait and somewhat unstable with decreased arm swing     DIAGNOSTIC STUDIES     PERTINENT RADIOLOGY  Following imaging studies were reviewed:     CT BRAIN 6/13/2020  1.  4.9 x 5.4 x 5.7 cm intraparenchymal hemorrhage centered in the left parietal lobe and extending to the left periatrial region.  There is a small surrounding rim of vasogenic type edema. Consider contrast enhanced brain MRI for further evaluation if   the patient is clinically able to get MRI.  2.  It does have local mass effect with partial effacement of the atrium of the left lateral ventricle. 2.4 mm midline shift to the right at the level of the lateral ventricles.   3.  3.4 mm subdural hematoma along the posterior left frontal convexity.     MRI, MRA brain 2/1/2021  MRI BRAIN:  1. No finding for acute infarct, acute hemorrhage, or mass.     2. Area of chronic encephalomalacic change, hemosiderin staining, and gliosis is seen along the left frontoparietal junction corresponding to the site of prior parenchymal hemorrhage.  2. Moderate to marked " dilatation of the lateral ventricles, stable compared to prior.     MRA [place] [facility] [place]:  1. No vascular cutoff from aneurysm, or flow-limiting stenosis.     MRI BRAIN 12/1/2020  1.  Chronic parenchymal hematoma within the left temporoparietal lobes with encephalomalacia and volume loss. Irregular central T2 hypointensity and enhancement likely relates to scarring, no definite underlying mass. Continued follow-up recommended to  establish longer-term stability. Consider vascular imaging if not previously performed to more definitively exclude vascular etiology of hemorrhage.     MRI BRAIN 8/31/2020  1.  Previously described intraparenchymal hematoma in the left parietal lobe with extension to the left temporoparietal region has markedly decreased in size since previous, now measuring 3.0 x 1.2 cm in AP x transverse dimensions. This primarily   involves the lateral left parietal lobe with slight extension to the left frontoparietal region and is accompanied by hemosiderin staining extending into the left temporoparietal region. While there is a small amount of FLAIR hyperintense diffusion   restriction compatible with acute to very early subacute infarct along the anterior and medial margin of the above-described residual intraparenchymal hemorrhage in the left parietal lobe, this presumably relates to an evolving parenchymal injury from   the above-described intraparenchymal hematoma and is accompanied by a background of adjacent FLAIR hyperintense presumed gliosis. Without contrast, evaluation for an abnormal underlying mass is limited. Follow-up contrast-enhanced brain MRI after   resolution of the above-described residual intraparenchymal hemorrhage will be of benefit to exclude an underlying mass.     2.  Previously described subarachnoid hemorrhage and intraventricular extension of hemorrhage have resolved in the interim.     3.  Hemosiderin staining in the left parietal lobe and left temporoparietal region is  accompanied by a small amount of hemosiderin staining along the atrium and occipital horn of the left lateral ventricle.     4.  Unchanged age-related changes, as above.     MRI BRAIN 6/13/2020  1.  Large left parietal parenchymal hemorrhage again noted. It is grossly unchanged in size. There is new intraventricular extension layering in the occipital horns and left atrium lateral ventricle compared to the prior study. Temporal horns are   slightly more prominent on the prior examination. Close interval follow-up suggested. Overall mass effect and midline shift is similar to the prior examination.     2.  There is multifocal stippled and somewhat nodular enhancement around the periphery of the parietal hemorrhage as detailed above. No definite focal masslike enhancement is identified. This may represent vascular structures and can occasionally be seen   in active bleeding.     3.  Stable thin left convexity subdural hematoma.     4.  Small volume subarachnoid hemorrhage over the right frontal operculum and temporal convexity.     5.  Overall, the constellation of findings is nonspecific. Consider amyloid angiopathy. Given the stable abnormal enhancement, a component of cerebral amyloid angiopathy related inflammation is not excluded though considered less likely.     EEG 4/19/2021  Diffusely slow more so on the left side.  No sharp activity arrhythmic discharges seen to suggest seizures    EEG  6/14/2020  This is a normal EEG during wakefulness and drowsiness except due to mild background dysrhythmia and the EEG being limited due to motion artifact.  EEG is not suggestive of epilepsy.  Further clinical correlation is needed.    NEUROPSYCHOLOGICAL EVALUATION 4/1/2021  Major Vascular Neurocognitive Disorder     Adjustment Disorder with Mixed Anxiety and Depressed Mood     RECOMMENDATIONS:  1) Continued neurologic follow up is recommended for ongoing monitoring and treatment. Consider a trial of a cholinesterase  inhibitor (e.g., donepezil) if not medically contraindicated, as this medication has been shown to benefit individuals with vascular cognitive impairment.     2) Ongoing participation in speech therapy (cognitive rehabilitation) is strongly encouraged. The patient appears to be benefiting from this in many ways.     3) Additional emotional support is also strongly recommended. I have provided Mr. Dueñas and his wife with various support groups in the community (for survivors and/or loved ones). I have also discussed with him a referral to see our psychologist for individual psychotherapy to help him process, cope, and even come to accept some of the losses he has endured as a result of his CVAs. Mr. Dueñas is not interested in either of these suggestions at this time, but asked that I mail him these resources in case he changes his mind. He does get some additional support from his [employer].     4) Consider an adjustment to his antidepressant medication regimen (either increasing the dosage or trying an alternative) to help better manage his emotional symptoms.     5) Mr. Dueñas is encouraged to adhere closely to his medication regimen in general, in order to keep his cerebrovascular risk factors (e.g., high blood pressure and high cholesterol) well managed. This may help to prevent future CVAs and additional cognitive decline.     6) If not already done, completion of paperwork for advance directives and assignment of healthcare and financial Power of  should be considered at this time.     7) Mr. Dueñas is in a living situation in which most of his complex daily activities (such as driving and financial management) are arranged and managed for him. This all remains appropriate. Thus, he is quite fortunate in that, despite the evidence of cognitive impairment, his current independent living situation appears to be working well for him.     8) Mr. Dueñas is encouraged to remain physically, socially, and mentally  active in order to optimize his brain health.     9) Neuropsychological follow-up is recommended in approximately 12 months (or as clinically indicated) in order to monitor his cognitive status and update recommendations. The current test data can be used as a baseline to which future comparisons can be made.     PERTINENT LABS  Following labs were reviewed:     Ref. Range 8/31/2020 09:06   Levetiracetam Latest Ref Range: 6.0 - 46.0 ug/mL 24.6                 Total time spent for face to face visit, reviewing labs/imaging studies, counseling and coordination of care was: 30 Minutes spent on the date of the encounter doing chart review, review of outside records, review of test results, interpretation of tests, patient visit and documentation     Orders Placed This Encounter   Procedures     Folate     Methylmalonic Acid     Vitamin B1 (Thiamine), Whole Blood (Metamarkets)     Treponema Abs w Reflex to RPR and Titer     Hepatic panel       This note was dictated using voice recognition software.  Any grammatical or context distortions are unintentional and inherent to the software.

## 2021-04-20 ENCOUNTER — AMBULATORY - HEALTHEAST (OUTPATIENT)
Dept: LAB | Facility: HOSPITAL | Age: 76
End: 2021-04-20

## 2021-04-20 DIAGNOSIS — F01.50 VASCULAR DEMENTIA WITHOUT BEHAVIORAL DISTURBANCE (H): ICD-10-CM

## 2021-04-20 LAB
ALBUMIN SERPL-MCNC: 4 G/DL (ref 3.5–5)
ALP SERPL-CCNC: 57 U/L (ref 45–120)
ALT SERPL W P-5'-P-CCNC: 36 U/L (ref 0–45)
AST SERPL W P-5'-P-CCNC: 23 U/L (ref 0–40)
BILIRUB DIRECT SERPL-MCNC: 0.3 MG/DL
BILIRUB SERPL-MCNC: 0.6 MG/DL (ref 0–1)
FOLATE SERPL-MCNC: 13.7 NG/ML
PROT SERPL-MCNC: 6 G/DL (ref 6–8)
T PALLIDUM AB SER QL: NEGATIVE

## 2021-04-23 LAB
METHYLMALONATE SERPL-SCNC: 0.23 UMOL/L (ref 0–0.4)
VIT B1 PYROPHOSHATE BLD-SCNC: 150 NMOL/L (ref 70–180)

## 2021-06-04 DIAGNOSIS — F01.50 VASCULAR DEMENTIA WITHOUT BEHAVIORAL DISTURBANCE (H): ICD-10-CM

## 2021-06-07 RX ORDER — DONEPEZIL HYDROCHLORIDE 5 MG/1
TABLET, FILM COATED ORAL
Qty: 30 TABLET | Refills: 0 | Status: SHIPPED | OUTPATIENT
Start: 2021-06-07 | End: 2021-07-01

## 2021-06-07 NOTE — TELEPHONE ENCOUNTER
Refill request for Aricept 5mg  Follow up scheduled for 6/28/21  Medication T'd for review and signature  Aura Felipe CMA on 6/7/2021 at 9:40 AM

## 2021-06-16 NOTE — PROGRESS NOTES
The patient was seen for a neuropsychological evaluation for the purposes of diagnostic clarification and treatment planning. 146 minutes of face-to-face testing were provided by this writer. An additional 60 minutes were spent scoring and compiling test results.The patient was cooperative with testing. No concerns were brought to my attention. Please see Dr. Hernandez's report for a detailed description of the charges and interpretation and integration of the findings.    Testing start: 1000 (4/1)  Testing stop: 1113 (4/1)  Testing start: 1125 (4/1)  Testing stop: 1238 (4/1)  Scoring start: 0830 (4/2)  Scoring stop: 0930 (4/2)

## 2021-06-16 NOTE — PROGRESS NOTES
"NEUROPSYCHOLOGY TELE-HEALTH FEEDBACK VISIT  [facility] Neurology [facility]      Video Visit  John Dueñas is a 75 y.o. male who is being evaluated via a billable video visit.      The patient has been notified of the following:     \"This video visit will be conducted via a call between you and your provider. We have found that certain health care needs can be provided without the need for an in-person physical exam.  This service lets us provide the care you need with a video conversation. If during the course of the call the physician/provider feels a video visit is not appropriate, you will not be charged for this service.\"    Patient has given verbal consent to a Video visit? Yes  Consent has been obtained for this service by 1 care team member: yes.    Patient would like the video invitation sent by: Send to e-mail at: [email or web address]    Visit Summary  The purpose of today s appointment was to provide feedback regarding Mr. Dueñas recent neuropsychological telehealth consult completed on 4/1/2021. His wife, Paige, joined us for today's visit. We began the session by discussing his experience during the evaluation. I provided Mr. Dueñas with detailed feedback regarding his performance on cognitive testing and his pattern of cognitive strengths and weaknesses.  I discussed my overall impressions and recommendations and provided the opportunity for Mr. Dueñas to ask any questions that he had about the evaluation. At the end of the session, he indicated that he understood the results and that I had answered all of his questions.       Autumn Hernandez PsyD, LP  Licensed Clinical Neuropsychologist  [facility]rology [facility]  [address], [address]  [place] [address]  Phone: [phone]    Video-Visit Details    Type of service:  Video Visit  Start Time: 3:07 PM  End Time: 3:58 PM    Originating Location (pt. Location): Home    Distant " Location (provider location):  Remote work for [facility] Neurology [facility] ([facility])    Mode of Communication:  Video Conference via [employer]    For diagnostic and coding purposes, Mr. Dueñas was referred for an evaluation of Mild Neurocognitive Disorder. As this is the final date for this Episode of Care (initiated on 4/1/2021) all charges for the entire Episode of Care will be filed today. Please see the 4/1/2021 evaluation for a detailed description of codes and services, including services provided today.      In brief:   1 x 96116  1 x 96121  1 x 96132  4 x 96133  1 x 96138  6 x 96139

## 2021-06-16 NOTE — PROGRESS NOTES
NEUROPSYCHOLOGY HYBRID (face-to-face and telehealth) EVALUATION  [facility]    NAME: John Dueñas    YOB: 1945   AGE: 75 y.o.  EDU: 20  DATE OF EVALUATION: 4/1/2021      REASON FOR REFERRAL:  Mr. Dueñas is a 75 y.o., right-handed,  male who sustained a large, nontraumatic intracranial hemorrhage (ICH) in the left parietal region, along with a slightly smaller subdural hematoma (SDH) in the posterior left frontal convexity, and a small volume subarachnoid hemorrhage (SAH) over the right temporal lobe and frontal operculum in June 2020, possibly secondary to cerebral amyloid angiopathy. He did not require any surgical intervention, and has participated in extensive rehabilitation therapies. Since the ICH, he has experienced significant receptive and expressive aphasia, apraxia, along with reported memory and processing speed issues. He was subsequently referred for this neuropsychological evaluation by neurologist, Tom Rivera MD, in order to assist with differential diagnosis and care planning.     SUMMARY OF FINDINGS: (please refer to Extended Report below for full details and comprehensive clinical history)  Due to the current COVID-19 pandemic that limits in-person clinical visits, this assessment was conducted using a hybrid model of both telehealth and face-to-face methods. The face-to-face portions were conducted with PPE worn by the examiner and a face-mask for the patient. The standard administration of these tests involves in-person, direct face-to-face methods. The full impact of applying non-standard administration methods via remote technology or with PPE is not fully appreciated at this time. As such, the diagnostic conclusions and recommendations for treatment provided in this report are being advanced with caution.    With these limitations in mind, results of testing indicate that Mr. Dueñas is of estimated high average premorbid intellectual  functioning; however, most of Mr. Dueñas's performances fall well below that estimate. Specifically, he exhibits profound impairments on tests of language processing, including comprehension of multi-step commands, sentence repetition, verbal fluencies, letter identification and word reading, and confrontation naming. However, cues tend to help his naming abilities, and his receptive vocabulary is within normal limits. Despite his difficulty comprehending/following multi-step commands, his comprehension of simple instructions is intact. He did require repetition and clarification of most test instructions, but appeared to eventually comprehend all task demands.     In addition to these language impairments, Mr. Johnson also demonstrates severe difficulties on tests of executive functioning (I.e., cognitive inhibition, novel problem-solving and concept identification, and mental flexibility/set-shifting), along with milder difficulties on tests of attention/concentration and psychomotor processing speed. His spatial judgment is also below expectation; however, this may be more related to vision issues as his visuospatial and constructional abilities are otherwise within normal limits.     Mr. Archuletas verbal learning and memory are variable, with relatively intact scores on a story memory task and impaired scores on a word list memory task. This discrepancy is likely due to his frontal/executive dysfunction, as the word list task requires more executive demands than the story memory task. In contrast to this variable verbal learning/memory, his nonverbal learning/memory is within normal limits. His visuospatial/constructional abilities are also largely intact.    Compared to prior testing from 2019 (prior to the CVA's), all of his test scores have significantly declined, although some have declined much more than others. Namely, dramatic declines are observed on tests of language processing, attention/concentration,  processing speed, and executive functions, which were largely average to exceptionally high in 2019 and now are mildly to severely impaired. While his learning, memory, and visuoconstructional abilities have also declined, they remain in the low average/average range currently.    Emotionally, the patient endorses moderate depressive symptoms and mild anxiety symptoms on self-report questionnaires. This is consistent with his reports during the clinical interview, during which he also acknowledges passive suicidal ideation but without current intent or plan.    IMPRESSIONS:    Overall, the current test results suggest generally moderate to severe dysfunction in the dominant lateral temporal, frontal, and subcortical regions. There appears to be relative sparing of the nondominant hemisphere and mesial temporal structures, as the cognitive functions that correlate with those regions are within normal limits (although all cognitive test scores have significantly declined since 2019).    Given the severity of his cognitive deficits after the CVAs in June and that he has become more dependent in IADLs as a result, he currently meets criteria for Major Vascular Neurocognitive Disorder. He has likely already made the bulk of his recovery already, but some additional subtle improvements in his cognition may be perceived over the next few months before his recovery starts to plateau.    Mr. Dueñas is also experiencing significant emotional distress since the CVAs, in large part due to the many losses he has sustained over the past year. His moderate depressive symptoms and mild anxiety may be exacerbating some of his cognitive deficits to some degree. Improvements in his mood may lead to slight improvements in some areas over time.    There is no evidence of any other independent neurodegenerative dementia syndrome at this time (such as Alzheimer's or a synucleinopathy), although his history of CVA and possible cerebral  amyloid angiopathy may put him at greater risk for developing a neurodegenerative dementia the future.     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 Consider a trial of a cholinesterase inhibitor (e.g., donepezil) if not medically contraindicated, as this medication has been shown to benefit individuals with vascular cognitive impairment.    2) Ongoing participation in speech therapy (cognitive rehabilitation) is strongly encouraged. The patient appears to be benefiting from this in many ways.    3) Additional emotional support is also strongly recommended. I have provided Mr. Dueñas and his wife with various support groups in the community (for survivors and/or loved ones). I have also discussed with him a referral to see our psychologist for individual psychotherapy to help him process, cope, and even come to accept some of the losses he has endured as a result of his CVAs. Mr. Dueñas is not interested in either of these suggestions at this time, but asked that I mail him these resources in case he changes his mind. He does get some additional support from his Buddhist.    4) Consider an adjustment to his antidepressant medication regimen (either increasing the dosage or trying an alternative) to help better manage his emotional symptoms.    5) Mr. Dueñas is encouraged to adhere closely to his medication regimen in general, in order to keep his cerebrovascular risk factors (e.g., high blood pressure and high cholesterol) well managed. This may help to prevent future CVAs and additional cognitive decline.    6) If not already done, completion of paperwork for advance directives and assignment of healthcare and financial Power of  should be considered at this time.    7) Mr. Dueñas is in a living situation in which most of his complex  daily activities (such as driving and financial management) are arranged and managed for him. This all remains appropriate. Thus, he is quite fortunate in that, despite the evidence of cognitive impairment, his current independent living situation appears to be working well for him.    8) Mr. Dueñas is encouraged to remain physically, socially, and mentally active in order to optimize his brain health.    9) Neuropsychological follow-up is recommended in approximately 12 months (or as clinically indicated) in order to monitor his cognitive status and update recommendations. The current test data can be used as a baseline to which future comparisons can be made.      FEEDBACK OF ASSESSMENT RESULTS:  Mr. Dueñas has requested to receive the results of this evaluation via a formal feedback appointment with me, which will be scheduled at the patient's convenience, typically within two weeks of today's date.     Thank you for allowing me to participate in Mr. Dueñas's care. Please contact me with any questions regarding the content of this report.        Autumn Hernandez PsyD,   Licensed Clinical Neuropsychologist  [address], [address]  [place] [address]  Phone: [phone]        --------------------------------EXTENDED REPORT--------------------------------    HISTORY OF PRESENTING PROBLEM:  The following information was obtained via medical record review and by interview of the patient and his wife, Paige.    Per medical records, on 6/13/2020, Mr. Snowden had just returned from a 2-mile walk with his wife and was beginning to build a bird house when he began to experience acute confusion, aphasia, and right-sided neglect. He was taken to the ED where he was found to be profoundly hypertensive (205/111) and a CT of the head revealed a large left parietal lobe bleed with vasogenic edema and midline shift, along with a 3.4 mm subdural  hemorrhage over the posterior left frontal convexity. MRI that same day also revealed a small volume subarachnoid hemorrhage over the right temporal lobe and frontal operculum. Neurosurgery was consulted but did not recommend any surgical intervention. He was started on prophylactic Keppra by neurology, and all EEG studies were normal. His blood pressure was difficult to control during the hospitalization, which delayed his discharge. His hospital course was further complicated by notable encephalopathy that required IV Precedex and restraints. This was thought to be secondary to IV dexamethasone. He also sustained a fall secondary to right-sided weakness, a vasovagal event secondary to hypotension, and DVT in his arm. His mental status improved but with ongoing expressive and receptive aphasia, and right homonymous hemianopsia.    Mr. Dueñas was discharged to [facility] on 6/24/2020, where he participated in physical, occupational, and speed therapies (PT, OT, ST). Psychology was also consulted. His stay was remarkable for object apraxia, right-sided neglect, vision deficits, and aphasia. He made progress in his therapies and was discharged home with home health care on 7/9/2020. He continued to participate in PT, OT,and ST. On 8/12/2020, he was started on Namenda due to cognitive difficulties by his PM&R provider, and the dosage was increased on 11/16/2020. Unfortunately, he experienced a few falls and the Namenda was subsequently discontinued.    With regard to his cognitive concerns more specifically, today Mr. Dueñas reported experiencing issues with expressive language since the CVAs in June, including trouble finding words and making numerous paraphasic errors. These difficulties become more pronounced as the day goes on. He also has notable difficulty with reading, as he is unable to recognize letters or break down a word phonetically in order to process it. If someone provides him  with a phonemic cue, he is generally able to read the rest of the word. Writing has also become difficult. Receptive language issues were also described, as he has a harder time comprehending conversations. He cites trouble following along with TV shows, even with closed captioning. These language issues have caused him to limit his social interaction, as he noted he only feels comfortable talking to about 3-4 people on the phone. In addition to language difficulties, he also reported memory concerns, as he forgets familiar people's names and forgets how to operate appliances and the TV remote (although this may be partially due to ongoing apraxia as well). He also loses his train of thought and feels slower to process information. He denied any noticeable concentration issues. Paige corroborated these reports and added that his expressive language issues seem to be more pronounced than his comprehension difficulty. Both the patient and Paige feel that his cognition is slowly improving over time, but he is far from his baseline level of functioning.    In fact, prior to the CVAs in 2020, Mr. Dueñas underwent previous neuropsychological testing on 10/31/2019 with Mary Negro, PhD, at [facility] due to frequent headaches and possible memory loss that he had been experiencing. He was administered a comprehensive battery of tests at that time, and his test scores were considered to be completely within normal limits, with scores generally falling in the high average range or above compared to others of his same age and education level.     Also prior to the CVAs, he was completely independent in all complex activities of daily living. Now, he is much more dependent in daily life. He no longer drives since the CVAs and he noted that if he were cleared to drive he would worry about remembering how to get to familiar places. He is able to independently remember to take his medications without issue.  Paige took over the bill paying and other financial management after the CVAs. He assists with simple meal preparation but no longer cooks and has trouble operating the microwave and stove. He is able to do some housework but now hires someone to help with the yard work. He remains independent in all basic ADLs.    MEDICAL HISTORY:  Aside from the previously described CVAs, Mr. Dueñas' medical history is additionally significant for hypercholesterolemia, hypertension, BPH, lumbar disc herniation with subsequent chronic back pain (currently rated as a 2 or 3 out of 10). Since the CVA, he occasionally experiences lightheadedness/dizziness upon standing. He also reported remote history of a concussion with brief LOC after falling out of a tree about 30 years ago. Residual symptoms were denied. He utilizes a walker to ambulate since the CVAs and endorsed falling about 2 months ago. He was discharged from PT but still does home exercises that they recommended. His taste and smell are not as sharp as they used to be prior to the CVAs but are improving over time. The patient denied any history of known seizure, tremor and hallucinations. To his knowledge, he has never had COVID-19.    The patient reported that his sleep has been variable since the CVAs, but he is generally sleeping more than he used to. He tends to get up about 3 times to urinate and noted that sometimes he has difficulty initiating sleep so he will go out and sleep on the couch. Paige reported that he occasionally has rapid jerking movements where he will kick his legs while sleeping, and that he occasionally snores. Witnessed apneas were denied. He estimates that he is typically getting 9-10 hours of sleep per night and reported that he feels well rested in the morning. He noted that he feels sluggish during the day.    Past Surgical History:   Procedure Laterality Date     TONSILLECTOMY  1951       Select diagnostic studies:  (please refer to the  patient's medical record for an extensive review of all neuroimaging studies)    EXAM: CT HEAD WO CONTRAST  LOCATION: [facility]  DATE/TIME: 6/13/2020 1:05 PM  IMPRESSION:   1.  4.9 x 5.4 x 5.7 cm intraparenchymal hemorrhage centered in the left parietal lobe and extending to the left periatrial region.  There is a small surrounding rim of vasogenic type edema. Consider contrast enhanced brain MRI for further evaluation if   the patient is clinically able to get MRI.  2.  It does have local mass effect with partial effacement of the atrium of the left lateral ventricle. 2.4 mm midline shift to the right at the level of the lateral ventricles.   3.  3.4 mm subdural hematoma along the posterior left frontal convexity.    EXAM: MR BRAIN W WO CONTRAST  LOCATION: [facility]  DATE/TIME: 6/13/2020 6:03 PM  IMPRESSION:   1.  Large left parietal parenchymal hemorrhage again noted. It is grossly unchanged in size. There is new intraventricular extension layering in the occipital horns and left atrium lateral ventricle compared to the prior study. Temporal horns are   slightly more prominent on the prior examination. Close interval follow-up suggested. Overall mass effect and midline shift is similar to the prior examination.   2.  There is multifocal stippled and somewhat nodular enhancement around the periphery of the parietal hemorrhage as detailed above. No definite focal masslike enhancement is identified. This may represent vascular structures and can occasionally be seen   in active bleeding.   3.  Stable thin left convexity subdural hematoma.   4.  Small volume subarachnoid hemorrhage over the right frontal operculum and temporal convexity.   5.  Overall, the constellation of findings is nonspecific. Consider amyloid angiopathy. Given the stable abnormal enhancement, a component of cerebral amyloid angiopathy related inflammation is not excluded though considered less likely.    EEG report  DATE:  06/14/20  IMPRESSION/REPORT/PLAN  This is a normal EEG during wakefulness and drowsiness except due to mild background dysrhythmia and the EEG being limited due to motion artifact.  EEG is not suggestive of epilepsy.  Further clinical correlation is needed.     Please note that the absence of epileptiform abnormalities does not exclude the possibility of epilepsy in any patient.     [facility]  MR BRAIN COW W WO CONTRAST  2/1/2021 9:14 AM  IMPRESSION:   MRI BRAIN:  1. No finding for acute infarct, acute hemorrhage, or mass.   2. Area of chronic encephalomalacic change, hemosiderin staining, and gliosis is seen along the left frontoparietal junction corresponding to the site of prior parenchymal hemorrhage.  3. Moderate to marked dilatation of the lateral ventricles, stable compared to prior.     MRA [place] OF [place]:  1. No vascular cutoff from aneurysm, or flow-limiting stenosis.    Current medications include (per medical record):   Current Outpatient Medications:      acetaminophen (TYLENOL) 500 MG tablet, Take 500 mg by mouth every 8 (eight) hours as needed for pain., Disp: , Rfl:      amLODIPine (NORVASC) 10 MG tablet, Take 1 tablet (10 mg total) by mouth daily., Disp:  , Rfl: 0     calcium citrate-vitamin D3 (CITRACAL + D) 315 mg- 250 unit per tablet, Take 2 tablets by mouth 2 (two) times a day with meals. 630mg calcium/10mcg vit d, Disp: , Rfl:      cholecalciferol, vitamin D3, 1,000 unit (25 mcg) tablet, Take 1,000 Units by mouth 2 (two) times a day with meals., Disp: , Rfl:      cyanocobalamin 100 MCG tablet, Take 100 mcg by mouth daily., Disp: , Rfl:      glucosamine sul-chondroitn-msm 500-400-200 mg Tab, Take 1 tablet by mouth 2 (two) times a day with meals., Disp: , Rfl:      hydrALAZINE (APRESOLINE) 25 MG tablet, Take 1 tablet (25 mg total) by mouth every 8 (eight) hours., Disp:  , Rfl: 0     hydrALAZINE (APRESOLINE) 50 MG tablet, Take 1 tablet (50 mg total) by mouth every 6 (six)  "hours as needed (SBP >140)., Disp:  , Rfl: 0     melatonin 3 mg Tab tablet, Take 3 mg by mouth at bedtime as needed., Disp: , Rfl:      metoprolol tartrate (LOPRESSOR) 100 MG tablet, Take 1 tablet (100 mg total) by mouth 2 (two) times a day., Disp:  , Rfl: 0     multivitamin therapeutic tablet, Take 1 tablet by mouth daily., Disp: , Rfl:      omega-3 fatty acids/vitamin E (SUPER EPA ORAL), Take 1 tablet by mouth 2 (two) times a day with meals. Strength: EPA: 300mg  DHA: 200mg, Disp: , Rfl:      saw palmetto fruit 450 mg cap, Take 1 tablet by mouth 3 (three) times a day with meals., Disp: , Rfl:      senna-docusate (PERICOLACE) 8.6-50 mg tablet, Take 1 tablet by mouth 2 (two) times a day., Disp:  , Rfl: 0    RELEVANT FAMILY MEDICAL HISTORY:   Family neurologic history is significant for stroke-related cognitive impairment in his grandmother. Other family members have had CVAs as well, including an uncle who had one at age 60, two other uncles and his grandmother. Several immediate and extended family members have a history of seizures.     PSYCHIATRIC HISTORY:  With regard to his psychiatric history, Mr. Dueñas denied a history of depression, anxiety, or other mental health conditions. Paige recalled some situational stress after graduating from graduate school and having difficulty finding work in his field at the time.     Currently, the patient described his mood as \"it varies, but overall okay.\" He noted that he feels anxious about the loss of control he experiences, and is grieving the loss of his abilities, including his ability to read (which was his passion), drive a car, or sail. He also endorsed the loss of a friend due to COVID-19 this year. Paige added that she observes significant frustration when he is unable to come up with words while conversing. As noted previously, he largely avoids social interactions or remains quiet in groups because of his language deficits. He was started on Lexapro during his " hospitalization for the CVAs; he is uncertain if it has helped but Paige believes it has. He has talked with a couple of different psychologists since the CVA for just one session here and there, but is not currently participating in any psychotherapy. He noted that he is not interested in doing so at this time. He endorsed experiencing passive suicidal ideation but denied any current intent or plan.    With regard to substance abuse, Mr. Dueñas denied history of past drug or alcohol abuse or dependence. He has never been a smoker. Current substance use was denied.    SOCIAL HISTORY:  Mr. Dueñas was born and raised in [place], [place]. He graduated high school and went on to earn a Bachelor's degree from [employer] and then his Doctorate in Mathematics from the [employer]. He earned mostly above average grades throughout his schooling and graduated Summa Cum Laude in college. Significant learning difficulties or developmental attention issues were denied. 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 He retired at age 54. He has been  for 54 years, and they have 2 children together. The patient and his wife live together in their own home in [place], [place]. Prior to the CVAs and the emergence of COVID-19, he was active in Sabianism groups and did tutoring for prisoners. He enjoyed reading and writing, and continues to go for a 30-minute walk daily.    TESTS ADMINISTERED:   Wechsler Memory Scale-III Information/Orientation, Wechsler Adult Intelligence Scale-IV (select subtests), Wide Range Achievement Test-4 (select subtests), Wechsler Memory Scale-IV (select subtests), DAVID Sentence Repetition, Token Test, [place] Picture Vocabulary Test-4 (PPVT-4), [place] Verbal Learning Test-Short Form, Trailmaking Test, Controlled Oral Word Association  Test and Category Fluency, [place] Naming Test-2, Clock Drawing Test, [place] Card Sorting Test-1 deck, [place] Judgement of Line Orientation, Generalized Anxiety Disorder-7 Assessment and Geriatric Depression Scale-Short Form.    MOANS norms were used for BNT, Trail Making Test A & B, COWAT & Category Fluency    DESCRIPTIVE PERFORMANCE KEY:    Labels for tests with Normal Distributions  Score Label Standard Score %ile Rank   Exceptionally high score  > 130 > 98   Above average score 120-129 91-97   High average score 110-119 75-90   Average score  25-74   Low average score 80-89 9-24   Below average score 70-79 2-8   Exceptionally low score < 70 < 2     Labels for tests with Non-Normal Distributions  Score Label %ile Rank   Within normal expectations/ limits score (WNL) > 24   Low average score 9-24   Below average score 2-8   Exceptionally low score < 2     The following test results utilize score labels as adapted from Christiano Fagan, Maxim Herrera, Erica Aj, CAMILLA Jaeger, Randolph Avendano Michael Westerveld & Conference Participatnts (2020): American Academy of Clinical Neuropsychology consensus conference statement on uniform labeling of performance test scores, The Clinical Neuropsychologist, DOI: [email or web address]. All scores contain some measure of error; scores are reported here as they are obtained by the individual (without reference to the range of error). These are meant as labels and not interpretation of performance. While other relevant comments regarding task performance are provided below, please see the Summary, Impressions, and Diagnosis sections of this report for interpretation of the scores and the cognitive profile as a whole, including what does and does not constitute impairment for this particular individual.    COVID-19-ERA NEUROPSYCHOLOGY LIMITATIONS:  Due to the ongoing pandemic, this assessment was conducted with the  examiner wearing [facility]-designated PPE and the patient wearing a face mask. The standard administration of these procedures involves direct face-to-face methods. The impact of applying non-standard administration methods has been evaluated only in part by scientific research. While every effort was made to simulate standard assessment practices, the diagnostic conclusions and recommendations for treatment provided in this report are being advanced with these limitations in mind.    BEHAVIORAL OBSERVATIONS:   Mr. Dueñas arrived on time and accompanied by his wife to today's appointment. He was appropriately dressed and groomed. Mr. Dueñas appeared alert and engaged. No vision or hearing difficulties were observed. Conversational speech was notable for occasional phonemic and semantic paraphasias and word-finding difficulty, but otherwise was of normal rate, volume, and prosody. His thought process appeared linear and goal-directed. No hallucinations or delusions were apparent. Judgment and insight appeared intact. His mood was dysthymic and his affect was restricted. Rapport was easily established and eye contact was appropriate.     During testing, he was alert throughout. He was pleasant and cooperative throughout the evaluation, although was noticeably frustration on language tasks. He continued to make several paraphasic errors and had word-finding trouble during testing. He required repetition and clarification of test instructions but eventually appeared to understand all task demands. No frontal signs were observed behaviorally. Mr. Dueñas appeared adequately motivated and engaged easily during testing. His score on an embedded measure of performance validity was in the valid range. Overall, the following results are considered a reasonably valid estimation of his current cognitive abilities.    OPTIMAL PREMORBID INTELLECT:  Optimal premorbid intellectual abilities were estimated as falling in the high  "average range based on Mr. Dueñas's educational and occupational histories and performance on tasks least likely to be affected by acquired brain dysfunction (administered in 2019, pre-CVAs).    SUMMARY OF TEST RESULTS:  ORIENTATION. Performance on a mental status exam, assessing orientation to personal and current information, resulted in a low average score. He was oriented to place, time, month, day of the month, and day of the week, and was able to correctly name the current and previous presidents. However, when asked his age he said \"I want to say 89 - no, 44.\" Similarly, when he wa asked to say the current year he stated, \"75. No, 95. No, 44.\" Lastly, he could not recall the name of the clinic.    ATTENTION/WORKING MEMORY. The patient's score on a measure sensitive to sustained auditory-verbal attention and concentration (WAIS-IV Digit Span) was classified as below average, as he was able to recite up to 4 digits forward (a low average score), up to 2 digits backward (an exceptionally low score), and up to 3 digits in sequence (a low average score).      PROCESSING SPEED. His score on a test of speeded digit-symbol coding was below average (WAIS-IV Coding). On a test of complex concentration that requires speeded numeric sequencing (Trails A), the patient's score was exceptionally low for completion time and with 2 errors.      LANGUAGE PROCESSING. The patient's ability to follow increasingly complex commands was exceptionally low (Token Test). More specifically, he was able to correctly follow simple one-step commands (e.g., \"point to a Kenaitze\") but had increasingly more difficulty following multi-step commands (e.g., \"point to a small white Kenaitze\" and \" the white square and the green Kenaitze\"). Sentence repetition was classified as \"severely defective.\" His receptive vocabulary was measured as an average score (PPVT-4). On a word reading task (WRAT-4), the patient had difficulty naming letters (+7/15) " and his overall score was below average. He was notably frustrated on that task. Confrontation naming was measured as a below average score (37/60; BNT) although he did benefit quite a bit from phonemic cues on that task (+14/23). The patient's performance on a test of phonemic fluency resulted in an exceptionally low score (COWAT), and his semantic fluency was also exceptionally low (Category Fluency). Of note, she made numerous intrusion errors on those tasks. His writing sample was intact and there was no evidence of micrographia.     VISUOSPATIAL/CONSTRUCTIONAL SKILLS. His score on a test of visuoconstruction with three-dimensional blocks was average (WAIS-IV Block Design). Spatial judgment, as measured by Judgment of Line Orientation, was classified as below average. On a Clock Drawing Task, the patient was able to draw a large, well-formed [place] with equally spaced and correctly placed numbers. His clock hands converged nicely in the center of the clock face and were well-differentiated by length.      LEARNING/MEMORY. On the WMS-IV Logical Memory subtest, immediate memory for a paragraph-length story resulted in an average score. After a 20-minute delay, the patient's score on the delayed recall trial was low average with a 64% retention rate. Recognition memory on that task was average.    On a 9-item verbal list-learning task (CVLT-II Short Form), the patient acquired up to 5 words (56%) of the word list by the 4th and final learning trial (raw scores over trials = 5, 4, 3, 3). Total learning acquisition was ibelow average. Of note, he made 19 intrusion errors on the learning trials along with 11 repetition errors. During the distractor task when he was asked to count backwards from 100, he instead stated random numbers between 1 and 10. Following that distractor task, the patient recalled 0 words from the list but instead counted from 1 to 15; this resulted in a score that wasexceptionally low. After a  10-minute delay, the patient recalled 1 word from the list (along with 8 intrusion errors and 2 repetition errors), which was below average. His recall did not benefit further from semantic cues (2 correct items and 9 intrusion errors); {below average). Recognition discriminability was low average, as he recognized 7/9 items (low average) but made 5 false-positive errors (below average).     On a visual memory measure (WMS-IV Visual Reproduction), immediate recall of simple geometric figures was average, as was his delayed recall of the figures with a 48% retention rate. Delayed recognition of the figures was above average.     EXECUTIVE FUNCTIONS. Concept identification and the ability to generate alternative problem solving strategies was impaired and was ultimately discontinued (WCST-64). On a test that requires speeded alpha-numeric sequencing/cognitive set-shifting (Trails B), Mr. Dueñas was able to correctly get to the number 6 without any errors when he reached the time limit and the task was discontinued. As described previously, phonemic fluency was exceptionally low (COWAT). Performance on the Clock Drawing Test was impaired, only because he was unable to accurately represent analog time.    MOOD. On the GDS-15 a self report measure of depressive symptomatology, he obtained a score of 10, placing him in the range of moderate depression. He endorsed passive suicidal ideation without intent. On the DANNY-7, a self-report measure of anxiety, he obtained a score of 7, placing him in the range of mild anxiety.    ____________________________________________________________________________________    SERVICES PROVIDED & TIME:  On-site Office Visit Details   (including both face-to-face testing time and remote interview with on-site equipment)  Type of service: Office Visit    Remote, on-site interview with Provider (Dr. Autumn Hernandez):   Video Start Time: 8:30 AM   Video End Time: 9:50 AM    Face-to-Face Testing with  Trained Examiner/Technician (Rupali Estes):   Start Time: 10:00 AM   End Time: 12:38 PM      Originating and Distant Location (patient and provider location): [facility])    Mode of Communication: Video Conference via netZentry    A clinical interview/neurobehavioral status examination was conducted with the patient and documented. I thoroughly reviewed the medical record, selected the neuropsychological test battery, provided supervision to the trained examiner/technician, interpreted/integrated patient data and test results, and engaged in clinical decision making, treatment planning, report writing/preparation and provision of interactive feedback of test results on 4/15/2021. A trained examiner/technician administered and scored the neuropsychological tests (2+ tests).  Please see below for a breakdown of time spent and the associated codes billed for these services.  Services   Time Spent  CPT Codes   Neurobehavioral Status Exam:  (e.g., clinical interview and neurobehavioral status exam via telehealth, interpretation, report)   92 minutes   1 x 96116  1 x 96121   Neuropsychological Evaluation Services:   (e.g., integration, interpretation, treatment planning, clinical decision making, feedback via telehealth)   293 minutes   1 x 96132  4 x 96133   Neuropsychological Testing by Trained Examiner/Technician:  (e.g., test administration, scoring, 2+ tests administered)   206 minutes   1 x 96138  6 x 96139   For diagnostic and coding purposes, Mr. 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 He was referred for an evaluation of major neurocognitive disorder. Please note, all charges are filed at the completion of the Episode of Care and associated with the final encounter date (feedback session on 4/15/2021).

## 2021-06-16 NOTE — PATIENT INSTRUCTIONS - HE
SUMMARY OF FINDINGS:  Due to the current COVID-19 pandemic that limits in-person clinical visits, this assessment was conducted using a hybrid model of both telehealth and face-to-face methods. The face-to-face portions were conducted with PPE worn by the examiner and a face-mask for the patient. The standard administration of these tests involves in-person, direct face-to-face methods. The full impact of applying non-standard administration methods via remote technology or with PPE is not fully appreciated at this time. As such, the diagnostic conclusions and recommendations for treatment provided in this report are being advanced with caution.     With these limitations in mind, results of testing indicate that Mr. Dueñas is of estimated high average premorbid intellectual functioning; however, most of Mr. Dueñas's performances fall well below that estimate. Specifically, he exhibits profound impairments on tests of language processing, including comprehension of multi-step commands, sentence repetition, verbal fluencies, letter identification and word reading, and confrontation naming. However, cues tend to help his naming abilities, and his receptive vocabulary is within normal limits. Despite his difficulty comprehending/following multi-step commands, his comprehension of simple instructions is intact. He did require repetition and clarification of most test instructions, but appeared to eventually comprehend all task demands.      In addition to these language impairments, Mr. Johnson also demonstrates severe difficulties on tests of executive functioning (I.e., cognitive inhibition, novel problem-solving and concept identification, and mental flexibility/set-shifting), along with milder difficulties on tests of attention/concentration and psychomotor processing speed. His spatial judgment is also below expectation; however, this may be more related to vision issues as his visuospatial and constructional  abilities are otherwise within normal limits.      Mr. Johnson's verbal learning and memory are variable, with relatively intact scores on a story memory task and impaired scores on a word list memory task. This discrepancy is likely due to his frontal/executive dysfunction, as the word list task requires more executive demands than the story memory task. In contrast to this variable verbal learning/memory, his nonverbal learning/memory is within normal limits. His visuospatial/constructional abilities are also largely intact.     Compared to prior testing from 2019 (prior to the CVA's), all of his test scores have significantly declined, although some have declined much more than others. Namely, dramatic declines are observed on tests of language processing, attention/concentration, processing speed, and executive functions, which were largely average to exceptionally high in 2019 and now are mildly to severely impaired. While his learning, memory, and visuoconstructional abilities have also declined, they remain in the low average/average range currently.     Emotionally, the patient endorses moderate depressive symptoms and mild anxiety symptoms on self-report questionnaires. This is consistent with his reports during the clinical interview, during which he also acknowledges passive suicidal ideation but without current intent or plan.     IMPRESSIONS:    Overall, the current test results suggest generally moderate to severe dysfunction in the dominant lateral temporal, frontal, and subcortical regions. There appears to be relative sparing of the nondominant hemisphere and mesial temporal structures, as the cognitive functions that correlate with those regions are within normal limits (although all cognitive test scores have significantly declined since 2019).    Given the severity of his cognitive deficits after the CVAs in June and that he has become more dependent in IADLs as a result, he currently meets criteria  for Major Vascular Neurocognitive Disorder. He has likely already made the bulk of his recovery already, but some additional subtle improvements in his cognition may be perceived over the next few months before his recovery starts to plateau.    Mr. Dueñas is also experiencing significant emotional distress since the CVAs, in large part due to the many losses he has sustained over the past year. His moderate depressive symptoms and mild anxiety may be exacerbating some of his cognitive deficits to some degree. Improvements in his mood may lead to slight improvements in some areas over time.    There is no evidence of any other independent neurodegenerative dementia syndrome at this time (such as Alzheimer's or a synucleinopathy), although his history of CVA and possible cerebral amyloid angiopathy may put him at greater risk for developing a neurodegenerative dementia the future.      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 Consider a trial of a cholinesterase inhibitor (e.g., donepezil) if not medically contraindicated, as this medication has been shown to benefit individuals with vascular cognitive impairment.     2) Ongoing participation in speech therapy (cognitive rehabilitation) is strongly encouraged. The patient appears to be benefiting from this in many ways.     3) Additional emotional support is also strongly recommended. I have provided Mr. Dueñas and his wife with various support groups in the community (for survivors and/or loved ones). I have also discussed with him a referral to see our psychologist for individual psychotherapy to help him process, cope, and even come to accept some of the losses he has endured as a result of his CVAs. Mr. Dueñas is not interested in either of these suggestions at this time, but asked that I  mail him these resources in case he changes his mind. He does get some additional support from his Protestant.     4) Consider an adjustment to his antidepressant medication regimen (either increasing the dosage or trying an alternative) to help better manage his emotional symptoms.     5) Mr. Dueñas is encouraged to adhere closely to his medication regimen in general, in order to keep his cerebrovascular risk factors (e.g., high blood pressure and high cholesterol) well managed. This may help to prevent future CVAs and additional cognitive decline.     6) If not already done, completion of paperwork for advance directives and assignment of healthcare and financial Power of  should be considered at this time.     7) Mr. Dueñas is in a living situation in which most of his complex daily activities (such as driving and financial management) are arranged and managed for him. This all remains appropriate. Thus, he is quite fortunate in that, despite the evidence of cognitive impairment, his current independent living situation appears to be working well for him.     8) Mr. Dueñas is encouraged to remain physically, socially, and mentally active in order to optimize his brain health.     9) Neuropsychological follow-up is recommended in approximately 12 months (or as clinically indicated) in order to monitor his cognitive status and update recommendations. The current test data can be used as a baseline to which future comparisons can be made.      Autumn Hernandez PsyD, LP  Licensed Clinical Neuropsychologist  [facility] [address], [address]  [place] [address]  Phone: [phone]      If you are interested in considering individual psychotherapy (for either yourself or your spouse), I would recommend Meggan Liu PsyD, LP at our clinic. To schedule with Dr. Liu, you may call our clinic at [phone].    Stroke Support Groups (currently  meeting virtually):    [facility]: Meets every 3rd Monday from 5-6 PM. Contact: Arabella at [phone]    [facility] - Meets every 4th Wednesday from 1-2 PM (Stroke survivors only). Contact: Sunshine at [phone]    Call Karley at the [facility] for other support groups/locations - [phone]    Cognitive Strategies    Take notes! Keep a small notepad with you in your purse/pocket/bag and write things down that you want to remember. You might also keep a notepad in a convenient location in your home (e.g., next to your telephone or calendar). Taking your notes in a note pad (instead of on multiple post-it notes) might help you stay organized, and you will also remember where to go to look for the notes that you took.    Create checklists, grocery lists, and to-do lists. Cross things off as you finish them.    Use a calendar or planner and get in the habit of checking it daily. Make it a part of your morning and/or nighttime routine to remind yourself of upcoming events, activities, or appointments. Cross the days off as they pass so that you can quickly glance at the calendar to know what day it is and what you have going on.    Set alarm reminders. For example, you can set an alarm to remind you to take your medications, turn off the oven, turn off the sprinkler outside, or to start getting ready for your appointment. If you use a smart phone, often times you can label the alarm that goes off so that you know what the alarm is for when it chimes.    Use a pillbox to follow your medication regimen. A pillbox acts as a nice visual cue to remind us to take our medications. If you have trouble remembering whether or not you have already taken your medications today, a pillbox can be extremely helpful to help with this issue.    Use a GPS when driving to help you stay on route.    When having an important conversation or completing an important task, reduce as  many distractions in the environment as you can. For example, turn off the TV or the music in the background. Turn off or silence your cell phone. Clear your work area of everything that you do not need for the task at hand.    Establish set locations for certain items. For example, if you keep your keys on a hook by your door, you will always know where to go to look for them.     Maintain a daily routine, especially for morning and nighttime tasks.

## 2021-07-01 ENCOUNTER — OFFICE VISIT (OUTPATIENT)
Dept: NEUROLOGY | Facility: CLINIC | Age: 76
End: 2021-07-01
Payer: COMMERCIAL

## 2021-07-01 VITALS
HEART RATE: 56 BPM | BODY MASS INDEX: 21.76 KG/M2 | DIASTOLIC BLOOD PRESSURE: 71 MMHG | SYSTOLIC BLOOD PRESSURE: 140 MMHG | HEIGHT: 70 IN | WEIGHT: 152 LBS

## 2021-07-01 DIAGNOSIS — I61.0 NONTRAUMATIC SUBCORTICAL HEMORRHAGE OF LEFT CEREBRAL HEMISPHERE (H): ICD-10-CM

## 2021-07-01 DIAGNOSIS — F01.50 VASCULAR DEMENTIA WITHOUT BEHAVIORAL DISTURBANCE (H): Primary | ICD-10-CM

## 2021-07-01 PROCEDURE — 99214 OFFICE O/P EST MOD 30 MIN: CPT | Performed by: PSYCHIATRY & NEUROLOGY

## 2021-07-01 RX ORDER — DONEPEZIL HYDROCHLORIDE 10 MG/1
10 TABLET, FILM COATED ORAL AT BEDTIME
Qty: 90 TABLET | Refills: 3 | Status: SHIPPED | OUTPATIENT
Start: 2021-07-01 | End: 2022-06-23

## 2021-07-01 ASSESSMENT — MONTREAL COGNITIVE ASSESSMENT (MOCA)
WHAT LEVEL OF EDUCATION WAS ATTAINED: 0
11. FOR EACH PAIR OF WORDS, WHAT CATEGORY DO THEY BELONG TO (OUT OF 2): 2
8. SERIAL SUBTRACTION OF 7S: 0
WHAT IS THE TOTAL SCORE (OUT OF 30): 15
12. MEMORY INDEX SCORE: 0
VISUOSPATIAL/EXECUTIVE SUBSCORE: 3
9. REPEAT EACH SENTENCE: 1
6. READ LIST OF DIGITS [FORWARD/BACKWARD]: 2
13. ORIENTATION SUBSCORE: 3
10. [FLUENCY] NAME WORDS STARTING WITH DESIGNATED LETTER: 0
4. NAME EACH OF THE THREE ANIMALS SHOWN: 3
7. [VIGILENCE] TAP WHEN HEARING DESIGNATED LETTER: 1

## 2021-07-01 ASSESSMENT — MIFFLIN-ST. JEOR: SCORE: 1420.72

## 2021-07-01 NOTE — NURSING NOTE
YUKO COGNITIVE ASSESSMENT (MOCA)  Version 7.1 Original Version  VISUOSPATIAL/EXECUTIVE               COPY CUBE      [ 0   ]                                [ 1   ] DRAW CLOCK (Ten past eleven)  (3 points)    [  1  ]                    [  1  ]               [  0  ]       Contour            Numbers     Hands POINTS               3    / 5   NAMING    [ 1  ]                                                                        [  1  ]                                             [ 1   ]  Livictor manuel Smiley                                Camel                 3    / 3   MEMORY Read list of words, subject must repeat them. Do 2 trials, even if 1st trial is successful. Do a recall after 5 minutes  FACE VELVET Baptism OSMANI RED No Points    1st    x x     2nd   x x x    ATTENTION Read list of digits (1 digit/sec) Subject has to repeat in the forward order       [  1  ]   2  1  8  5  4                                [ 1   ] 7 4 2                      2    /2   Read list of letters. The subject must tap with his hand at each letter A. No points if > 2 errors.  [ 1   ] F B A C M N A A J K L B A F A K D E A A A J A M O F A A B            1  /1   Serial 7 subtraction starting at 100          [  0  ] 93         [0    ] 86          [ 0   ] 79          [ 0   ] 72         [ 0   ] 65   4 or 5 correct subtractions: 3 points,  2 or 3 correct: 2 points,  1correct: 1 point,   0 correct: 0 points        0    /3   LANGUAGE Repeat: I only know that Too is the one to help today. [1     ]                                      The cat always hid under the couch when dogs were in the room. [  0 ]             0  /2   Fluency: Name maximum number of words in one minute that begin with the letter F                                                                                                                    [  0  ] __3_ (N > 11 words)            0   /1   ABSTRACTION  Similarity between e.g. banana-orange=fruit                                                                   [ 1   ] train-bicycle                      [ 1   ] watch-ruler           2 /2   DELAYED  RECALL Has to recall words  WITH NO CUE FACE  [    ] VELVET  [    ] Restorationist  [    ]  OSMANI  [    ] RED  [    ] Points for UNCUED recall only        0    /5           OPTIONAL Category cue 0 0 1 1 1      Multiple choice cue          ORIENTATION  [ 1   ] Date     [ 1   ] Month       [  0  ] Year      [ 0   ] Day      [ 0   ] Place        [ 1   ] City      3    /6   TOTAL  Normal > 26/30 Add 1 point if < 12 years education     15   /30

## 2021-07-01 NOTE — NURSING NOTE
Chief Complaint   Patient presents with     Left parietal intracerebral hemorrhage with extension into v     Memory Loss     MOCA_15     Aura Felipe CMA on 7/1/2021 at 12:54 PM

## 2021-07-01 NOTE — LETTER
2021         RE: John Dueñas  [address]  [facility] [address]        Dear Colleague,    Thank you for referring your patient, John Dueñas, to the [facility] [place]. Please see a copy of my visit note below.    NEUROLOGY FOLLOW UP VISIT  NOTE       [facility] [place]  [address] [place] [address]  Tel: [phone]  Fax: [phone]  [email or web address]     John Dueñas,  1945, MRN [id number]  PCP: Malcolm Sun  Date: 2021      ASSESSMENT & PLAN     Diagnosis code  1. Vascular dementia without behavioral disturbance (H)  2. 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  Work-up including neuropsychological testing suggest major neurocognitive disorder likely vascular.  Lab work for common causes of cognitive decline was normal.  I have asked him to increase the dose of Aricept to 10 mg daily.  I am checking hepatic profile.  Follow-up will be in 1 year, earlier if there is any problem    Left parietal intracerebral hemorrhage with extension into ventricle  75 years old male with history of HTN, HLD who was admitted to [facility] in 2020 with confusion and speech difficulty.  He had left parietal temporal ICH with extension into the ventricles.  This has gradually resolved and changes on subsequent MRI scan raise the possibility of amyloid angiopathy.  He was on Keppra for seizure prophylaxis but as he remained symptom-free Keppra was gradually tapered off and he has remained seizure-free.    Thank you again for this referral, please feel free to contact me if you have any questions.    Tom Rivera MD  [facility]  (Formerly, [facility]      HISTORY OF PRESENT ILLNESS     Patient is a 76-year-old male with history of left hemispheric intracerebral hemorrhage with extension into the ventricle and vascular dementia who returns for follow-up.  He was admitted to the hospital in June 2020 with confusion and speech difficulty and CT scan showed a left parietal ICH with extension into the ventricle.  The location raised the possibility of amyloid angiopathy.  MRI of the brain again suggested amyloid angiopathy and most recent MRI scan showed changes due to encephalomalacia but no vascular abnormality.  Patient reports that he has been struggling with words and also memory.  He had neuropsychological testing that confirmed major neurocognitive disorder likely vascular.  His underlying depression and anxiety likely was making things worse.  Lab work for common causes of cognitive decline was normal.  I started him on Aricept during his last visit and he reports some improvement in his speech and memory.  He has experienced no symptoms or side effects.    During his admission for left intracranial hemorrhage he was started on Keppra for seizure prophylaxis.  As he had remained seizure-free he was told to gradually taper himself off Keppra and has remained symptom-free.     PROBLEM LIST   Patient Active Problem List   Diagnosis Code     Hypercholesteremia E78.00     Intracerebral hemorrhage, nontraumatic (H) I61.9     Lumbar disc herniation with radiculopathy M51.16     Myopia H52.10     Impaired fasting glucose R73.01     Malignant essential hypertension I10     Advance care planning Z71.89     Depression with anxiety F41.8     Mixed type age-related cataract, both eyes H25.813     Vascular dementia (H) F01.50         PAST MEDICAL & SURGICAL HISTORY     Past Medical History:   Patient  has a past medical history of Intracranial hemorrhage (H) (8/6/2020), SAH (subarachnoid hemorrhage) (H) (6/14/2020), and Subdural hematoma (H) (6/24/2020).    Surgical  "History:  He  has a past surgical history that includes tonsillectomy.     SOCIAL HISTORY     Reviewed, and he  reports that he has never smoked. He has never used smokeless tobacco. He reports previous alcohol use.     FAMILY HISTORY     Reviewed, and family history includes Chronic Obstructive Pulmonary Disease in his father; Diabetes in his maternal grandfather; Heart Disease in his brother; Multiple myeloma in his mother.     ALLERGIES     Allergies   Allergen Reactions     Ether Other (See Comments)     Lactose Other (See Comments)         REVIEW OF SYSTEMS     A 12 point review of system was performed and was negative except as outlined in the history of present illness.     HOME MEDICATIONS     Current Outpatient Rx   Medication Sig Dispense Refill     acetaminophen (TYLENOL) 500 MG tablet Take 1 tablet by mouth       calcium citrate-vitamin D (CITRACAL) 315-250 MG-UNIT TABS per tablet Take 2 tablets by mouth       cholecalciferol (VITAMIN D-1000 MAX ST) 25 MCG (1000 UT) TABS Take 1,000 Units by mouth       donepezil (ARICEPT) 10 MG tablet Take 1 tablet (10 mg) by mouth At Bedtime 90 tablet 3     escitalopram (LEXAPRO) 10 MG tablet Take 10 mg by mouth At Bedtime        Glucosamine-Chondroit-Vit C-Mn (GLUCOSAMINE CHONDROITINOITIN COMPLEX) CAPS        lisinopril (ZESTRIL) 10 MG tablet TAKE 1 TABLET BY MOUTH EVERY EVENING       melatonin 5 MG tablet        metoprolol succinate ER (TOPROL-XL) 50 MG 24 hr tablet Take 50 mg by mouth       multivitamin w/minerals (MULTI-VITAMIN) tablet Take 1 tablet by mouth       senna-docusate (SENOKOT-S/PERICOLACE) 8.6-50 MG tablet Take 2 tablets by mouth       tamsulosin (FLOMAX) 0.4 MG capsule Take 0.4 mg by mouth       vitamin B-12 (CYANOCOBALAMIN) 1000 MCG tablet Take 1,000 mcg by mouth            PHYSICAL EXAM     Vital signs  BP (!) 140/71 (BP Location: Right arm, Patient Position: Sitting)   Pulse 56   Ht 1.778 m (5' 10\")   Wt 68.9 kg (152 lb)   BMI 21.81 kg/m  "     Weight:   152 lbs 0 oz    GENERAL PHYSICAL EXAM: Patient is alert and oriented x 4 in no acute distress. Neck was supple, no carotid bruits, thyromegaly, lymphadenopathy or JVD noted.   NEUROLOGICAL EXAM:  Patient is alert and oriented times 3 he can tell me his name date and place.  He can tell me the name of the president but he struggles with serial 7.  Pupil equal round and reactive face symmetrical tongue midline.  He has right pronator drift.  Strength on the right 5/5 on the left 5/5.  Reflexes 2+ on the right 1+ on the left he has some dysmetria on finger-nose testing he has a wide-based gait and somewhat unstable with decreased arm swing     DIAGNOSTIC STUDIES     PERTINENT RADIOLOGY  Following imaging studies were reviewed:     CT BRAIN 6/13/2020  1.  4.9 x 5.4 x 5.7 cm intraparenchymal hemorrhage centered in the left parietal lobe and extending to the left periatrial region.  There is a small surrounding rim of vasogenic type edema. Consider contrast enhanced brain MRI for further evaluation if   the patient is clinically able to get MRI.  2.  It does have local mass effect with partial effacement of the atrium of the left lateral ventricle. 2.4 mm midline shift to the right at the level of the lateral ventricles.   3.  3.4 mm subdural hematoma along the posterior left frontal convexity.     MRI, MRA brain 2/1/2021  MRI BRAIN:  1. No finding for acute infarct, acute hemorrhage, or mass.     2. Area of chronic encephalomalacic change, hemosiderin staining, and gliosis is seen along the left frontoparietal junction corresponding to the site of prior parenchymal hemorrhage.  2. Moderate to marked dilatation of the lateral ventricles, stable compared to prior.     MRA [place]:  1. No vascular cutoff from aneurysm, or flow-limiting stenosis.     MRI BRAIN 12/1/2020  1.  Chronic parenchymal hematoma within the left temporoparietal lobes with encephalomalacia and volume loss. Irregular central T2  hypointensity and enhancement likely relates to scarring, no definite underlying mass. Continued follow-up recommended to  establish longer-term stability. Consider vascular imaging if not previously performed to more definitively exclude vascular etiology of hemorrhage.     MRI BRAIN 8/31/2020  1.  Previously described intraparenchymal hematoma in the left parietal lobe with extension to the left temporoparietal region has markedly decreased in size since previous, now measuring 3.0 x 1.2 cm in AP x transverse dimensions. This primarily   involves the lateral left parietal lobe with slight extension to the left frontoparietal region and is accompanied by hemosiderin staining extending into the left temporoparietal region. While there is a small amount of FLAIR hyperintense diffusion   restriction compatible with acute to very early subacute infarct along the anterior and medial margin of the above-described residual intraparenchymal hemorrhage in the left parietal lobe, this presumably relates to an evolving parenchymal injury from   the above-described intraparenchymal hematoma and is accompanied by a background of adjacent FLAIR hyperintense presumed gliosis. Without contrast, evaluation for an abnormal underlying mass is limited. Follow-up contrast-enhanced brain MRI after   resolution of the above-described residual intraparenchymal hemorrhage will be of benefit to exclude an underlying mass.     2.  Previously described subarachnoid hemorrhage and intraventricular extension of hemorrhage have resolved in the interim.     3.  Hemosiderin staining in the left parietal lobe and left temporoparietal region is accompanied by a small amount of hemosiderin staining along the atrium and occipital horn of the left lateral ventricle.     4.  Unchanged age-related changes, as above.     MRI BRAIN 6/13/2020  1.  Large left parietal parenchymal hemorrhage again noted. It is grossly unchanged in size. There is new  intraventricular extension layering in the occipital horns and left atrium lateral ventricle compared to the prior study. Temporal horns are   slightly more prominent on the prior examination. Close interval follow-up suggested. Overall mass effect and midline shift is similar to the prior examination.     2.  There is multifocal stippled and somewhat nodular enhancement around the periphery of the parietal hemorrhage as detailed above. No definite focal masslike enhancement is identified. This may represent vascular structures and can occasionally be seen   in active bleeding.     3.  Stable thin left convexity subdural hematoma.     4.  Small volume subarachnoid hemorrhage over the right frontal operculum and temporal convexity.     5.  Overall, the constellation of findings is nonspecific. Consider amyloid angiopathy. Given the stable abnormal enhancement, a component of cerebral amyloid angiopathy related inflammation is not excluded though considered less likely.     EEG 4/19/2021  Diffusely slow more so on the left side.  No sharp activity arrhythmic discharges seen to suggest seizures     EEG  6/14/2020  This is a normal EEG during wakefulness and drowsiness except due to mild background dysrhythmia and the EEG being limited due to motion artifact.  EEG is not suggestive of epilepsy.  Further clinical correlation is needed.     NEUROPSYCHOLOGICAL EVALUATION 4/1/2021  Major Vascular Neurocognitive Disorder     Adjustment Disorder with Mixed Anxiety and Depressed Mood     RECOMMENDATIONS:  1) Continued neurologic follow up is recommended for ongoing monitoring and treatment. Consider a trial of a cholinesterase inhibitor (e.g., donepezil) if not medically contraindicated, as this medication has been shown to benefit individuals with vascular cognitive impairment.     2) Ongoing participation in speech therapy (cognitive rehabilitation) is strongly encouraged. The patient appears to be benefiting from this in  many ways.     3) Additional emotional support is also strongly recommended. I have provided Mr. Dueñas and his wife with various support groups in the community (for survivors and/or loved ones). I have also discussed with him a referral to see our psychologist for individual psychotherapy to help him process, cope, and even come to accept some of the losses he has endured as a result of his CVAs. Mr. Dueñas is not interested in either of these suggestions at this time, but asked that I mail him these resources in case he changes his mind. He does get some additional support from his [employer].     4) Consider an adjustment to his antidepressant medication regimen (either increasing the dosage or trying an alternative) to help better manage his emotional symptoms.     5) Mr. Dueñas is encouraged to adhere closely to his medication regimen in general, in order to keep his cerebrovascular risk factors (e.g., high blood pressure and high cholesterol) well managed. This may help to prevent future CVAs and additional cognitive decline.     6) If not already done, completion of paperwork for advance directives and assignment of healthcare and financial Power of  should be considered at this time.     7) Mr. Dueñas is in a living situation in which most of his complex daily activities (such as driving and financial management) are arranged and managed for him. This all remains appropriate. Thus, he is quite fortunate in that, despite the evidence of cognitive impairment, his current independent living situation appears to be working well for him.     8) Mr. Dueñas is encouraged to remain physically, socially, and mentally active in order to optimize his brain health.     9) Neuropsychological follow-up is recommended in approximately 12 months (or as clinically indicated) in order to monitor his cognitive status and update recommendations. The current test data can be used as a baseline to which future comparisons can be  made.           PERTINENT LABS  Following labs were reviewed:  Ambulatory - [facility] on 04/20/2021   Component Date Value     Bilirubin Total 04/20/2021 0.6      Bilirubin Direct 04/20/2021 0.3      Protein Total 04/20/2021 6.0      Albumin 04/20/2021 4.0      Alkaline Phosphatase 04/20/2021 57      AST 04/20/2021 23      ALT 04/20/2021 36      Vitamin B1 Whole Blood L* 04/20/2021 150      Methylmalonic Acid 04/20/2021 0.23      Folic Acid 04/20/2021 13.7      Treponema Antibody Total 04/20/2021 Negative          Total time spent for face to face visit, reviewing labs/imaging studies, counseling and coordination of care was: 30 Minutes spent on the date of the encounter doing chart review, review of outside records, review of test results, interpretation of tests, patient visit and documentation       This note was dictated using voice recognition software.  Any grammatical or context distortions are unintentional and inherent to the software.    Orders Placed This Encounter   Procedures     Hepatic panel      New Prescriptions    No medications on file     Modified Medications    Modified Medication Previous Medication    DONEPEZIL (ARICEPT) 10 MG TABLET donepezil (ARICEPT) 5 MG tablet       Take 1 tablet (10 mg) by mouth At Bedtime    TAKE 1 TABLET BY MOUTH AT BEDTIME                     Again, thank you for allowing me to participate in the care of your patient.        Sincerely,        Tom Rivera MD

## 2021-07-01 NOTE — PROGRESS NOTES
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  Work-up including neuropsychological testing suggest major neurocognitive disorder likely vascular.  Lab work for common causes of cognitive decline was normal.  I have asked him to increase the dose of Aricept to 10 mg daily.  I am checking hepatic profile.  Follow-up will be in 1 year, earlier if there is any problem    Left parietal intracerebral hemorrhage with extension into ventricle  75 years old male with history of HTN, HLD who was admitted to [facility] in 2020 with confusion and speech difficulty.  He had left parietal temporal ICH with extension into the ventricles.  This has gradually resolved and changes on subsequent MRI scan raise the possibility of amyloid angiopathy.  He was on Keppra for seizure prophylaxis but as he remained symptom-free Keppra was gradually tapered off and he has remained seizure-free.    Thank you again for this referral, please feel free to contact me if you have any questions.    Tom Rivera MD  [facility]  (Formerly, [facility]     HISTORY OF PRESENT ILLNESS     Patient is a 76-year-old male with history of left hemispheric intracerebral hemorrhage with extension into the ventricle and vascular dementia who returns for follow-up.  He was admitted to the hospital in 2020 with confusion  and speech difficulty and CT scan showed a left parietal ICH with extension into the ventricle.  The location raised the possibility of amyloid angiopathy.  MRI of the brain again suggested amyloid angiopathy and most recent MRI scan showed changes due to encephalomalacia but no vascular abnormality.  Patient reports that he has been struggling with words and also memory.  He had neuropsychological testing that confirmed major neurocognitive disorder likely vascular.  His underlying depression and anxiety likely was making things worse.  Lab work for common causes of cognitive decline was normal.  I started him on Aricept during his last visit and he reports some improvement in his speech and memory.  He has experienced no symptoms or side effects.    During his admission for left intracranial hemorrhage he was started on Keppra for seizure prophylaxis.  As he had remained seizure-free he was told to gradually taper himself off Keppra and has remained symptom-free.     PROBLEM LIST   Patient Active Problem List   Diagnosis Code     Hypercholesteremia E78.00     Intracerebral hemorrhage, nontraumatic (H) I61.9     Lumbar disc herniation with radiculopathy M51.16     Myopia H52.10     Impaired fasting glucose R73.01     Malignant essential hypertension I10     Advance care planning Z71.89     Depression with anxiety F41.8     Mixed type age-related cataract, both eyes H25.813     Vascular dementia (H) F01.50         PAST MEDICAL & SURGICAL HISTORY     Past Medical History:   Patient  has a past medical history of Intracranial hemorrhage (H) (8/6/2020), SAH (subarachnoid hemorrhage) (H) (6/14/2020), and Subdural hematoma (H) (6/24/2020).    Surgical History:  He  has a past surgical history that includes tonsillectomy.     SOCIAL HISTORY     Reviewed, and he  reports that he has never smoked. He has never used smokeless tobacco. He reports previous alcohol use.     FAMILY HISTORY     Reviewed, and family history  "includes Chronic Obstructive Pulmonary Disease in his father; Diabetes in his maternal grandfather; Heart Disease in his brother; Multiple myeloma in his mother.     ALLERGIES     Allergies   Allergen Reactions     Ether Other (See Comments)     Lactose Other (See Comments)         REVIEW OF SYSTEMS     A 12 point review of system was performed and was negative except as outlined in the history of present illness.     HOME MEDICATIONS     Current Outpatient Rx   Medication Sig Dispense Refill     acetaminophen (TYLENOL) 500 MG tablet Take 1 tablet by mouth       calcium citrate-vitamin D (CITRACAL) 315-250 MG-UNIT TABS per tablet Take 2 tablets by mouth       cholecalciferol (VITAMIN D-1000 MAX ST) 25 MCG (1000 UT) TABS Take 1,000 Units by mouth       donepezil (ARICEPT) 10 MG tablet Take 1 tablet (10 mg) by mouth At Bedtime 90 tablet 3     escitalopram (LEXAPRO) 10 MG tablet Take 10 mg by mouth At Bedtime        Glucosamine-Chondroit-Vit C-Mn (GLUCOSAMINE CHONDROITINOITIN COMPLEX) CAPS        lisinopril (ZESTRIL) 10 MG tablet TAKE 1 TABLET BY MOUTH EVERY EVENING       melatonin 5 MG tablet        metoprolol succinate ER (TOPROL-XL) 50 MG 24 hr tablet Take 50 mg by mouth       multivitamin w/minerals (MULTI-VITAMIN) tablet Take 1 tablet by mouth       senna-docusate (SENOKOT-S/PERICOLACE) 8.6-50 MG tablet Take 2 tablets by mouth       tamsulosin (FLOMAX) 0.4 MG capsule Take 0.4 mg by mouth       vitamin B-12 (CYANOCOBALAMIN) 1000 MCG tablet Take 1,000 mcg by mouth            PHYSICAL EXAM     Vital signs  BP (!) 140/71 (BP Location: Right arm, Patient Position: Sitting)   Pulse 56   Ht 1.778 m (5' 10\")   Wt 68.9 kg (152 lb)   BMI 21.81 kg/m      Weight:   152 lbs 0 oz    GENERAL PHYSICAL EXAM: Patient is alert and oriented x 4 in no acute distress. Neck was supple, no carotid bruits, thyromegaly, lymphadenopathy or JVD noted.   NEUROLOGICAL EXAM:  Patient is alert and oriented times 3 he can tell me his name " date and place.  He can tell me the name of the president but he struggles with serial 7.  Pupil equal round and reactive face symmetrical tongue midline.  He has right pronator drift.  Strength on the right 5/5 on the left 5/5.  Reflexes 2+ on the right 1+ on the left he has some dysmetria on finger-nose testing he has a wide-based gait and somewhat unstable with decreased arm swing     DIAGNOSTIC STUDIES     PERTINENT RADIOLOGY  Following imaging studies were reviewed:     CT BRAIN 6/13/2020  1.  4.9 x 5.4 x 5.7 cm intraparenchymal hemorrhage centered in the left parietal lobe and extending to the left periatrial region.  There is a small surrounding rim of vasogenic type edema. Consider contrast enhanced brain MRI for further evaluation if   the patient is clinically able to get MRI.  2.  It does have local mass effect with partial effacement of the atrium of the left lateral ventricle. 2.4 mm midline shift to the right at the level of the lateral ventricles.   3.  3.4 mm subdural hematoma along the posterior left frontal convexity.     MRI, MRA brain 2/1/2021  MRI BRAIN:  1. No finding for acute infarct, acute hemorrhage, or mass.     2. Area of chronic encephalomalacic change, hemosiderin staining, and gliosis is seen along the left frontoparietal junction corresponding to the site of prior parenchymal hemorrhage.  2. Moderate to marked dilatation of the lateral ventricles, stable compared to prior.     MRA [place] [facility]:  1. No vascular cutoff from aneurysm, or flow-limiting stenosis.     MRI BRAIN 12/1/2020  1.  Chronic parenchymal hematoma within the left temporoparietal lobes with encephalomalacia and volume loss. Irregular central T2 hypointensity and enhancement likely relates to scarring, no definite underlying mass. Continued follow-up recommended to  establish longer-term stability. Consider vascular imaging if not previously performed to more definitively exclude vascular etiology of  hemorrhage.     MRI BRAIN 8/31/2020  1.  Previously described intraparenchymal hematoma in the left parietal lobe with extension to the left temporoparietal region has markedly decreased in size since previous, now measuring 3.0 x 1.2 cm in AP x transverse dimensions. This primarily   involves the lateral left parietal lobe with slight extension to the left frontoparietal region and is accompanied by hemosiderin staining extending into the left temporoparietal region. While there is a small amount of FLAIR hyperintense diffusion   restriction compatible with acute to very early subacute infarct along the anterior and medial margin of the above-described residual intraparenchymal hemorrhage in the left parietal lobe, this presumably relates to an evolving parenchymal injury from   the above-described intraparenchymal hematoma and is accompanied by a background of adjacent FLAIR hyperintense presumed gliosis. Without contrast, evaluation for an abnormal underlying mass is limited. Follow-up contrast-enhanced brain MRI after   resolution of the above-described residual intraparenchymal hemorrhage will be of benefit to exclude an underlying mass.     2.  Previously described subarachnoid hemorrhage and intraventricular extension of hemorrhage have resolved in the interim.     3.  Hemosiderin staining in the left parietal lobe and left temporoparietal region is accompanied by a small amount of hemosiderin staining along the atrium and occipital horn of the left lateral ventricle.     4.  Unchanged age-related changes, as above.     MRI BRAIN 6/13/2020  1.  Large left parietal parenchymal hemorrhage again noted. It is grossly unchanged in size. There is new intraventricular extension layering in the occipital horns and left atrium lateral ventricle compared to the prior study. Temporal horns are   slightly more prominent on the prior examination. Close interval follow-up suggested. Overall mass effect and midline shift is  similar to the prior examination.     2.  There is multifocal stippled and somewhat nodular enhancement around the periphery of the parietal hemorrhage as detailed above. No definite focal masslike enhancement is identified. This may represent vascular structures and can occasionally be seen   in active bleeding.     3.  Stable thin left convexity subdural hematoma.     4.  Small volume subarachnoid hemorrhage over the right frontal operculum and temporal convexity.     5.  Overall, the constellation of findings is nonspecific. Consider amyloid angiopathy. Given the stable abnormal enhancement, a component of cerebral amyloid angiopathy related inflammation is not excluded though considered less likely.     EEG 4/19/2021  Diffusely slow more so on the left side.  No sharp activity arrhythmic discharges seen to suggest seizures     EEG  6/14/2020  This is a normal EEG during wakefulness and drowsiness except due to mild background dysrhythmia and the EEG being limited due to motion artifact.  EEG is not suggestive of epilepsy.  Further clinical correlation is needed.     NEUROPSYCHOLOGICAL EVALUATION 4/1/2021  Major Vascular Neurocognitive Disorder     Adjustment Disorder with Mixed Anxiety and Depressed Mood     RECOMMENDATIONS:  1) Continued neurologic follow up is recommended for ongoing monitoring and treatment. Consider a trial of a cholinesterase inhibitor (e.g., donepezil) if not medically contraindicated, as this medication has been shown to benefit individuals with vascular cognitive impairment.     2) Ongoing participation in speech therapy (cognitive rehabilitation) is strongly encouraged. The patient appears to be benefiting from this in many ways.     3) Additional emotional support is also strongly recommended. I have provided Mr. Dueñas and his wife with various support groups in the community (for survivors and/or loved ones). I have also discussed with him a referral to see our psychologist for  individual psychotherapy to help him process, cope, and even come to accept some of the losses he has endured as a result of his CVAs. Mr. Dueñas is not interested in either of these suggestions at this time, but asked that I mail him these resources in case he changes his mind. He does get some additional support from his Worship.     4) Consider an adjustment to his antidepressant medication regimen (either increasing the dosage or trying an alternative) to help better manage his emotional symptoms.     5) Mr. Dueñas is encouraged to adhere closely to his medication regimen in general, in order to keep his cerebrovascular risk factors (e.g., high blood pressure and high cholesterol) well managed. This may help to prevent future CVAs and additional cognitive decline.     6) If not already done, completion of paperwork for advance directives and assignment of healthcare and financial Power of  should be considered at this time.     7) Mr. Dueñas is in a living situation in which most of his complex daily activities (such as driving and financial management) are arranged and managed for him. This all remains appropriate. Thus, he is quite fortunate in that, despite the evidence of cognitive impairment, his current independent living situation appears to be working well for him.     8) Mr. Dueñas is encouraged to remain physically, socially, and mentally active in order to optimize his brain health.     9) Neuropsychological follow-up is recommended in approximately 12 months (or as clinically indicated) in order to monitor his cognitive status and update recommendations. The current test data can be used as a baseline to which future comparisons can be made.           PERTINENT LABS  Following labs were reviewed:  Ambulatory - [facility] on 04/20/2021   Component Date Value     Bilirubin Total 04/20/2021 0.6      Bilirubin Direct 04/20/2021 0.3      Protein Total 04/20/2021 6.0      Albumin 04/20/2021 4.0       Alkaline Phosphatase 04/20/2021 57      AST 04/20/2021 23      ALT 04/20/2021 36      Vitamin B1 Whole Blood L* 04/20/2021 150      Methylmalonic Acid 04/20/2021 0.23      Folic Acid 04/20/2021 13.7      Treponema Antibody Total 04/20/2021 Negative          Total time spent for face to face visit, reviewing labs/imaging studies, counseling and coordination of care was: 30 Minutes spent on the date of the encounter doing chart review, review of outside records, review of test results, interpretation of tests, patient visit and documentation     This note was dictated using voice recognition software.  Any grammatical or context distortions are unintentional and inherent to the software.    Orders Placed This Encounter   Procedures     Hepatic panel      New Prescriptions    No medications on file     Modified Medications    Modified Medication Previous Medication    DONEPEZIL (ARICEPT) 10 MG TABLET donepezil (ARICEPT) 5 MG tablet       Take 1 tablet (10 mg) by mouth At Bedtime    TAKE 1 TABLET BY MOUTH AT BEDTIME

## 2022-06-23 DIAGNOSIS — F01.50 VASCULAR DEMENTIA WITHOUT BEHAVIORAL DISTURBANCE (H): ICD-10-CM

## 2022-06-23 RX ORDER — DONEPEZIL HYDROCHLORIDE 10 MG/1
TABLET, FILM COATED ORAL
Qty: 30 TABLET | Refills: 0 | Status: SHIPPED | OUTPATIENT
Start: 2022-06-23 | End: 2022-07-01

## 2022-07-01 ENCOUNTER — OFFICE VISIT (OUTPATIENT)
Dept: NEUROLOGY | Facility: CLINIC | Age: 77
End: 2022-07-01
Payer: COMMERCIAL

## 2022-07-01 ENCOUNTER — LAB (OUTPATIENT)
Dept: LAB | Facility: HOSPITAL | Age: 77
End: 2022-07-01
Payer: COMMERCIAL

## 2022-07-01 VITALS
SYSTOLIC BLOOD PRESSURE: 120 MMHG | RESPIRATION RATE: 16 BRPM | HEART RATE: 56 BPM | DIASTOLIC BLOOD PRESSURE: 72 MMHG | WEIGHT: 156 LBS | BODY MASS INDEX: 22.38 KG/M2

## 2022-07-01 DIAGNOSIS — F01.50 VASCULAR DEMENTIA WITHOUT BEHAVIORAL DISTURBANCE (H): ICD-10-CM

## 2022-07-01 DIAGNOSIS — F01.50 VASCULAR DEMENTIA WITHOUT BEHAVIORAL DISTURBANCE (H): Primary | ICD-10-CM

## 2022-07-01 PROBLEM — I61.9 INTRACEREBRAL HEMORRHAGE, NONTRAUMATIC (H): Status: RESOLVED | Noted: 2020-06-13 | Resolved: 2022-07-01

## 2022-07-01 LAB
ALBUMIN SERPL-MCNC: 3.7 G/DL (ref 3.5–5)
ALP SERPL-CCNC: 64 U/L (ref 45–120)
ALT SERPL W P-5'-P-CCNC: 41 U/L (ref 0–45)
AST SERPL W P-5'-P-CCNC: 28 U/L (ref 0–40)
BILIRUB DIRECT SERPL-MCNC: 0.3 MG/DL
BILIRUB SERPL-MCNC: 0.8 MG/DL (ref 0–1)
PROT SERPL-MCNC: 5.8 G/DL (ref 6–8)

## 2022-07-01 PROCEDURE — 80076 HEPATIC FUNCTION PANEL: CPT

## 2022-07-01 PROCEDURE — 36415 COLL VENOUS BLD VENIPUNCTURE: CPT

## 2022-07-01 PROCEDURE — 99214 OFFICE O/P EST MOD 30 MIN: CPT | Performed by: PSYCHIATRY & NEUROLOGY

## 2022-07-01 RX ORDER — DONEPEZIL HYDROCHLORIDE 10 MG/1
10 TABLET, FILM COATED ORAL AT BEDTIME
Qty: 90 TABLET | Refills: 3 | Status: SHIPPED | OUTPATIENT
Start: 2022-07-01 | End: 2023-06-12

## 2022-07-01 NOTE — PROGRESS NOTES
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  Work-up included neuropsychological testing that suggested major neurocognitive disorder likely vascular.  Lab work for common causes of cognitive decline was normal.  He was started on Aricept last year but continues to show cognitive decline.  He scored 13/30 on MoCA today compared to 15/30 last year.  I have recommended:    1.  Continue Aricept 10 mg daily.  I refilled his prescription, gave him 90-day supply with 3 refills  2.  Check hepatic profile  3.  Patient was told to remain socially active  4.  Follow-up will be in 1 year    Thank you again for this referral, please feel free to contact me if you have any questions.    Tom Rivera MD  [facility]  (Former[facility], [facility]     HISTORY OF PRESENT ILLNESS     Patient is a 77-year-old male with history of left hemispheric intracerebral hemorrhage with extension into the ventricle, vascular dementia, HTN, HLD who was last seen on 2021 when he had lab work for common causes of cognitive decline that was normal and was started on Aricept 10 mg daily.  Previously he had a left parietal intracerebral hemorrhage with extension into the ventricle and also MRI scan head raise the possibility of amyloid angiopathy.  He was on Keppra for seizure prophylaxis but as he had remained seizure-free Keppra was gradually tapered off and he had  remained symptom-free.  Since his last visit he reports no significant change.  He does feel his memory is declining somewhat and he did score lower on MoCA today compared to the last year.  He denies any scintillating scotomas, auditory visual hallucinations or any attacks     PROBLEM LIST   Patient Active Problem List   Diagnosis Code     Hypercholesteremia E78.00     Lumbar disc herniation with radiculopathy M51.16     Myopia H52.10     Impaired fasting glucose R73.01     Malignant essential hypertension I10     Advance care planning Z71.89     Depression with anxiety F41.8     Mixed type age-related cataract, both eyes H25.813     Vascular dementia (H) F01.50         PAST MEDICAL & SURGICAL HISTORY     Past Medical History:   Patient  has a past medical history of Herniated disc, Intracerebral hemorrhage, nontraumatic (H) (6/13/2020), Intracranial hemorrhage (H) (8/6/2020), SAH (subarachnoid hemorrhage) (H) (6/14/2020), and Subdural hematoma (H) (6/24/2020).    Surgical History:  He  has a past surgical history that includes tonsillectomy and Tonsillectomy (1950).     SOCIAL HISTORY     Reviewed, and he  reports that he has never smoked. He has never used smokeless tobacco. He reports previous alcohol use.     FAMILY HISTORY     Reviewed, and family history includes Chronic Obstructive Pulmonary Disease in his father; Diabetes in his maternal grandfather; Heart Disease in his brother; Multiple myeloma in his mother.     ALLERGIES     Allergies   Allergen Reactions     Ether Other (See Comments)     Lactose Other (See Comments)         REVIEW OF SYSTEMS     A 12 point review of system was performed and was negative except as outlined in the history of present illness.     HOME MEDICATIONS     Current Outpatient Rx   Medication Sig Dispense Refill     acetaminophen (TYLENOL) 500 MG tablet Take 1 tablet by mouth       calcium citrate-vitamin D (CITRACAL) 315-250 MG-UNIT TABS per tablet Take 2 tablets by mouth        cholecalciferol (VITAMIN D-1000 MAX ST) 25 MCG (1000 UT) TABS Take 1,000 Units by mouth       donepezil (ARICEPT) 10 MG tablet Take 1 tablet (10 mg) by mouth At Bedtime 90 tablet 3     escitalopram (LEXAPRO) 10 MG tablet Take 10 mg by mouth At Bedtime        Glucosamine-Chondroit-Vit C-Mn (GLUCOSAMINE CHONDROITINOITIN COMPLEX) CAPS        lisinopril (ZESTRIL) 10 MG tablet TAKE 1 TABLET BY MOUTH EVERY EVENING       melatonin 5 MG tablet        metoprolol succinate ER (TOPROL-XL) 50 MG 24 hr tablet Take 50 mg by mouth       multivitamin w/minerals (THERA-VIT-M) tablet Take 1 tablet by mouth       senna-docusate (SENOKOT-S/PERICOLACE) 8.6-50 MG tablet Take 2 tablets by mouth       tamsulosin (FLOMAX) 0.4 MG capsule Take 0.4 mg by mouth       vitamin B-12 (CYANOCOBALAMIN) 1000 MCG tablet Take 1,000 mcg by mouth            PHYSICAL EXAM     Vital signs  /72   Pulse 56   Resp 16   Wt 70.8 kg (156 lb)   BMI 22.38 kg/m      Weight:   156 lbs 0 oz    GENERAL PHYSICAL EXAM: Patient is alert and oriented x 4 in no acute distress. Neck was supple, no carotid bruits, thyromegaly, lymphadenopathy or JVD noted.   NEUROLOGICAL EXAM:  Patient is alert and oriented times 3 he can tell me his name date and place.  He scored 13/30 on MoCA today compared to 15/30 last year.  He can tell me the name of the president but he struggles with serial 7.  Pupil equal round and reactive face symmetrical tongue midline.  He has right pronator drift.  Strength on the right 5/5 on the left 5/5.  Reflexes 2+ on the right 1+ on the left he has some dysmetria on finger-nose testing he has a wide-based gait and somewhat unstable with decreased arm swing     PERTINENT DIAGNOSTIC STUDIES     Following studies were reviewed:     CT BRAIN 6/13/2020  1.  4.9 x 5.4 x 5.7 cm intraparenchymal hemorrhage centered in the left parietal lobe and extending to the left periatrial region.  There is a small surrounding rim of vasogenic type edema. Consider  contrast enhanced brain MRI for further evaluation if   the patient is clinically able to get MRI.  2.  It does have local mass effect with partial effacement of the atrium of the left lateral ventricle. 2.4 mm midline shift to the right at the level of the lateral ventricles.   3.  3.4 mm subdural hematoma along the posterior left frontal convexity.     MRI, MRA brain 2/1/2021  MRI BRAIN:  1. No finding for acute infarct, acute hemorrhage, or mass.     2. Area of chronic encephalomalacic change, hemosiderin staining, and gliosis is seen along the left frontoparietal junction corresponding to the site of prior parenchymal hemorrhage.  2. Moderate to marked dilatation of the lateral ventricles, stable compared to prior.     [facility] [place] [facility] [place]:  1. No vascular cutoff from aneurysm, or flow-limiting stenosis.     MRI BRAIN 12/1/2020  1.  Chronic parenchymal hematoma within the left temporoparietal lobes with encephalomalacia and volume loss. Irregular central T2 hypointensity and enhancement likely relates to scarring, no definite underlying mass. Continued follow-up recommended to  establish longer-term stability. Consider vascular imaging if not previously performed to more definitively exclude vascular etiology of hemorrhage.     MRI BRAIN 8/31/2020  1.  Previously described intraparenchymal hematoma in the left parietal lobe with extension to the left temporoparietal region has markedly decreased in size since previous, now measuring 3.0 x 1.2 cm in AP x transverse dimensions. This primarily   involves the lateral left parietal lobe with slight extension to the left frontoparietal region and is accompanied by hemosiderin staining extending into the left temporoparietal region. While there is a small amount of FLAIR hyperintense diffusion   restriction compatible with acute to very early subacute infarct along the anterior and medial margin of the above-described residual intraparenchymal hemorrhage in the left  parietal lobe, this presumably relates to an evolving parenchymal injury from   the above-described intraparenchymal hematoma and is accompanied by a background of adjacent FLAIR hyperintense presumed gliosis. Without contrast, evaluation for an abnormal underlying mass is limited. Follow-up contrast-enhanced brain MRI after   resolution of the above-described residual intraparenchymal hemorrhage will be of benefit to exclude an underlying mass.     2.  Previously described subarachnoid hemorrhage and intraventricular extension of hemorrhage have resolved in the interim.     3.  Hemosiderin staining in the left parietal lobe and left temporoparietal region is accompanied by a small amount of hemosiderin staining along the atrium and occipital horn of the left lateral ventricle.     4.  Unchanged age-related changes, as above.     MRI BRAIN 6/13/2020  1.  Large left parietal parenchymal hemorrhage again noted. It is grossly unchanged in size. There is new intraventricular extension layering in the occipital horns and left atrium lateral ventricle compared to the prior study. Temporal horns are   slightly more prominent on the prior examination. Close interval follow-up suggested. Overall mass effect and midline shift is similar to the prior examination.     2.  There is multifocal stippled and somewhat nodular enhancement around the periphery of the parietal hemorrhage as detailed above. No definite focal masslike enhancement is identified. This may represent vascular structures and can occasionally be seen   in active bleeding.     3.  Stable thin left convexity subdural hematoma.     4.  Small volume subarachnoid hemorrhage over the right frontal operculum and temporal convexity.     5.  Overall, the constellation of findings is nonspecific. Consider amyloid angiopathy. Given the stable abnormal enhancement, a component of cerebral amyloid angiopathy related inflammation is not excluded though considered less  likely.     EEG 4/19/2021  Diffusely slow more so on the left side.  No sharp activity arrhythmic discharges seen to suggest seizures     EEG  6/14/2020  This is a normal EEG during wakefulness and drowsiness except due to mild background dysrhythmia and the EEG being limited due to motion artifact.  EEG is not suggestive of epilepsy.  Further clinical correlation is needed.     NEUROPSYCHOLOGICAL EVALUATION 4/1/2021  Major Vascular Neurocognitive Disorder     Adjustment Disorder with Mixed Anxiety and Depressed Mood     RECOMMENDATIONS:  1) Continued neurologic follow up is recommended for ongoing monitoring and treatment. Consider a trial of a cholinesterase inhibitor (e.g., donepezil) if not medically contraindicated, as this medication has been shown to benefit individuals with vascular cognitive impairment.     2) Ongoing participation in speech therapy (cognitive rehabilitation) is strongly encouraged. The patient appears to be benefiting from this in many ways.     3) Additional emotional support is also strongly recommended. I have provided Mr. Dueñas and his wife with various support groups in the community (for survivors and/or loved ones). I have also discussed with him a referral to see our psychologist for individual psychotherapy to help him process, cope, and even come to accept some of the losses he has endured as a result of his CVAs. Mr. Dueñas is not interested in either of these suggestions at this time, but asked that I mail him these resources in case he changes his mind. He does get some additional support from his [facility].     4) Consider an adjustment to his antidepressant medication regimen (either increasing the dosage or trying an alternative) to help better manage his emotional symptoms.     5) Mr. Dueñas is encouraged to adhere closely to his medication regimen in general, in order to keep his cerebrovascular risk factors (e.g., high blood pressure and high cholesterol) well managed. This  may help to prevent future CVAs and additional cognitive decline.     6) If not already done, completion of paperwork for advance directives and assignment of healthcare and financial Power of  should be considered at this time.     7) Mr. Dueñas is in a living situation in which most of his complex daily activities (such as driving and financial management) are arranged and managed for him. This all remains appropriate. Thus, he is quite fortunate in that, despite the evidence of cognitive impairment, his current independent living situation appears to be working well for him.     8) Mr. Dueñas is encouraged to remain physically, socially, and mentally active in order to optimize his brain health.     9) Neuropsychological follow-up is recommended in approximately 12 months (or as clinically indicated) in order to monitor his cognitive status and update recommendations. The current test data can be used as a baseline to which future comparisons can be made.     PERTINENT LABS  Following labs were reviewed:  No visits with results within 3 Month(s) from this visit.   Latest known visit with results is:   Ambulatory - [facility] on 04/20/2021   Component Date Value     Bilirubin Total 04/20/2021 0.6      Bilirubin Direct 04/20/2021 0.3      Protein Total 04/20/2021 6.0      Albumin 04/20/2021 4.0      Alkaline Phosphatase 04/20/2021 57      AST 04/20/2021 23      ALT 04/20/2021 36      Vitamin B1 Whole Blood L* 04/20/2021 150      Methylmalonic Acid 04/20/2021 0.23      Folic Acid 04/20/2021 13.7      Treponema Antibody Total 04/20/2021 Negative          Total time spent for face to face visit, reviewing labs/imaging studies, counseling and coordination of care was: 30 Minutes spent on the date of the encounter doing chart review, review of outside records, review of test results, interpretation of tests, patient visit and documentation     This note was dictated using voice recognition software.  Any  grammatical or context distortions are unintentional and inherent to the software.    Orders Placed This Encounter   Procedures     Hepatic function panel      New Prescriptions    No medications on file     Modified Medications    Modified Medication Previous Medication    DONEPEZIL (ARICEPT) 10 MG TABLET donepezil (ARICEPT) 10 MG tablet       Take 1 tablet (10 mg) by mouth At Bedtime    TAKE 1 TABLET BY MOUTH EVERYDAY AT BEDTIME

## 2022-07-01 NOTE — LETTER
2022         RE: John Dueñas  [address]  [facility] [address]        Dear Colleague,    Thank you for referring your patient, John Dueñas, to the [facility] [place]. Please see a copy of my visit note below.    NEUROLOGY FOLLOW UP VISIT  NOTE       [facility] [place]  [address] [place] [address]  Tel: [phone]  Fax: [phone]  [email or web address]     John Dueñas,  1945, MRN [id number]  PCP: Malcolm Sun  Date: 2022      ASSESSMENT & PLAN     Visit Diagnosis  1. 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  Work-up included neuropsychological testing that suggested major neurocognitive disorder likely vascular.  Lab work for common causes of cognitive decline was normal.  He was started on Aricept last year but continues to show cognitive decline.  He scored 13/30 on MoCA today compared to 15/30 last year.  I have recommended:    1.  Continue Aricept 10 mg daily.  I refilled his prescription, gave him 90-day supply with 3 refills  2.  Check hepatic profile  3.  Patient was told to remain socially active  4.  Follow-up will be in 1 year    Thank you again for this referral, please feel free to contact me if you have any questions.    Tom Rivera MD  [facility]  (Formerly, [facility]     HISTORY OF PRESENT ILLNESS     Patient is a 77-year-old male with history of left hemispheric intracerebral hemorrhage with extension into the ventricle, vascular dementia, HTN, HLD who was last seen on 2021 when he had lab work for common causes of cognitive decline that was normal and was started on Aricept 10 mg daily.  Previously he  had a left parietal intracerebral hemorrhage with extension into the ventricle and also MRI scan head raise the possibility of amyloid angiopathy.  He was on Keppra for seizure prophylaxis but as he had remained seizure-free Keppra was gradually tapered off and he had remained symptom-free.  Since his last visit he reports no significant change.  He does feel his memory is declining somewhat and he did score lower on MoCA today compared to the last year.  He denies any scintillating scotomas, auditory visual hallucinations or any attacks     PROBLEM LIST   Patient Active Problem List   Diagnosis Code     Hypercholesteremia E78.00     Lumbar disc herniation with radiculopathy M51.16     Myopia H52.10     Impaired fasting glucose R73.01     Malignant essential hypertension I10     Advance care planning Z71.89     Depression with anxiety F41.8     Mixed type age-related cataract, both eyes H25.813     Vascular dementia (H) F01.50         PAST MEDICAL & SURGICAL HISTORY     Past Medical History:   Patient  has a past medical history of Herniated disc, Intracerebral hemorrhage, nontraumatic (H) (6/13/2020), Intracranial hemorrhage (H) (8/6/2020), SAH (subarachnoid hemorrhage) (H) (6/14/2020), and Subdural hematoma (H) (6/24/2020).    Surgical History:  He  has a past surgical history that includes tonsillectomy and Tonsillectomy (1950).     SOCIAL HISTORY     Reviewed, and he  reports that he has never smoked. He has never used smokeless tobacco. He reports previous alcohol use.     FAMILY HISTORY     Reviewed, and family history includes Chronic Obstructive Pulmonary Disease in his father; Diabetes in his maternal grandfather; Heart Disease in his brother; Multiple myeloma in his mother.     ALLERGIES     Allergies   Allergen Reactions     Ether Other (See Comments)     Lactose Other (See Comments)         REVIEW OF SYSTEMS     A 12 point review of system was performed and was negative except as outlined in the history  of present illness.     HOME MEDICATIONS     Current Outpatient Rx   Medication Sig Dispense Refill     acetaminophen (TYLENOL) 500 MG tablet Take 1 tablet by mouth       calcium citrate-vitamin D (CITRACAL) 315-250 MG-UNIT TABS per tablet Take 2 tablets by mouth       cholecalciferol (VITAMIN D-1000 MAX ST) 25 MCG (1000 UT) TABS Take 1,000 Units by mouth       donepezil (ARICEPT) 10 MG tablet Take 1 tablet (10 mg) by mouth At Bedtime 90 tablet 3     escitalopram (LEXAPRO) 10 MG tablet Take 10 mg by mouth At Bedtime        Glucosamine-Chondroit-Vit C-Mn (GLUCOSAMINE CHONDROITINOITIN COMPLEX) CAPS        lisinopril (ZESTRIL) 10 MG tablet TAKE 1 TABLET BY MOUTH EVERY EVENING       melatonin 5 MG tablet        metoprolol succinate ER (TOPROL-XL) 50 MG 24 hr tablet Take 50 mg by mouth       multivitamin w/minerals (THERA-VIT-M) tablet Take 1 tablet by mouth       senna-docusate (SENOKOT-S/PERICOLACE) 8.6-50 MG tablet Take 2 tablets by mouth       tamsulosin (FLOMAX) 0.4 MG capsule Take 0.4 mg by mouth       vitamin B-12 (CYANOCOBALAMIN) 1000 MCG tablet Take 1,000 mcg by mouth            PHYSICAL EXAM     Vital signs  /72   Pulse 56   Resp 16   Wt 70.8 kg (156 lb)   BMI 22.38 kg/m      Weight:   156 lbs 0 oz    GENERAL PHYSICAL EXAM: Patient is alert and oriented x 4 in no acute distress. Neck was supple, no carotid bruits, thyromegaly, lymphadenopathy or JVD noted.   NEUROLOGICAL EXAM:  Patient is alert and oriented times 3 he can tell me his name date and place.  He scored 13/30 on MoCA today compared to 15/30 last year.  He can tell me the name of the president but he struggles with serial 7.  Pupil equal round and reactive face symmetrical tongue midline.  He has right pronator drift.  Strength on the right 5/5 on the left 5/5.  Reflexes 2+ on the right 1+ on the left he has some dysmetria on finger-nose testing he has a wide-based gait and somewhat unstable with decreased arm swing     PERTINENT DIAGNOSTIC  STUDIES     Following studies were reviewed:     CT BRAIN 6/13/2020  1.  4.9 x 5.4 x 5.7 cm intraparenchymal hemorrhage centered in the left parietal lobe and extending to the left periatrial region.  There is a small surrounding rim of vasogenic type edema. Consider contrast enhanced brain MRI for further evaluation if   the patient is clinically able to get MRI.  2.  It does have local mass effect with partial effacement of the atrium of the left lateral ventricle. 2.4 mm midline shift to the right at the level of the lateral ventricles.   3.  3.4 mm subdural hematoma along the posterior left frontal convexity.     MRI, MRA brain 2/1/2021  MRI BRAIN:  1. No finding for acute infarct, acute hemorrhage, or mass.     2. Area of chronic encephalomalacic change, hemosiderin staining, and gliosis is seen along the left frontoparietal junction corresponding to the site of prior parenchymal hemorrhage.  2. Moderate to marked dilatation of the lateral ventricles, stable compared to prior.     MRA [place]:  1. No vascular cutoff from aneurysm, or flow-limiting stenosis.     MRI BRAIN 12/1/2020  1.  Chronic parenchymal hematoma within the left temporoparietal lobes with encephalomalacia and volume loss. Irregular central T2 hypointensity and enhancement likely relates to scarring, no definite underlying mass. Continued follow-up recommended to  establish longer-term stability. Consider vascular imaging if not previously performed to more definitively exclude vascular etiology of hemorrhage.     MRI BRAIN 8/31/2020  1.  Previously described intraparenchymal hematoma in the left parietal lobe with extension to the left temporoparietal region has markedly decreased in size since previous, now measuring 3.0 x 1.2 cm in AP x transverse dimensions. This primarily   involves the lateral left parietal lobe with slight extension to the left frontoparietal region and is accompanied by hemosiderin staining extending into the  left temporoparietal region. While there is a small amount of FLAIR hyperintense diffusion   restriction compatible with acute to very early subacute infarct along the anterior and medial margin of the above-described residual intraparenchymal hemorrhage in the left parietal lobe, this presumably relates to an evolving parenchymal injury from   the above-described intraparenchymal hematoma and is accompanied by a background of adjacent FLAIR hyperintense presumed gliosis. Without contrast, evaluation for an abnormal underlying mass is limited. Follow-up contrast-enhanced brain MRI after   resolution of the above-described residual intraparenchymal hemorrhage will be of benefit to exclude an underlying mass.     2.  Previously described subarachnoid hemorrhage and intraventricular extension of hemorrhage have resolved in the interim.     3.  Hemosiderin staining in the left parietal lobe and left temporoparietal region is accompanied by a small amount of hemosiderin staining along the atrium and occipital horn of the left lateral ventricle.     4.  Unchanged age-related changes, as above.     MRI BRAIN 6/13/2020  1.  Large left parietal parenchymal hemorrhage again noted. It is grossly unchanged in size. There is new intraventricular extension layering in the occipital horns and left atrium lateral ventricle compared to the prior study. Temporal horns are   slightly more prominent on the prior examination. Close interval follow-up suggested. Overall mass effect and midline shift is similar to the prior examination.     2.  There is multifocal stippled and somewhat nodular enhancement around the periphery of the parietal hemorrhage as detailed above. No definite focal masslike enhancement is identified. This may represent vascular structures and can occasionally be seen   in active bleeding.     3.  Stable thin left convexity subdural hematoma.     4.  Small volume subarachnoid hemorrhage over the right frontal  operculum and temporal convexity.     5.  Overall, the constellation of findings is nonspecific. Consider amyloid angiopathy. Given the stable abnormal enhancement, a component of cerebral amyloid angiopathy related inflammation is not excluded though considered less likely.     EEG 4/19/2021  Diffusely slow more so on the left side.  No sharp activity arrhythmic discharges seen to suggest seizures     EEG  6/14/2020  This is a normal EEG during wakefulness and drowsiness except due to mild background dysrhythmia and the EEG being limited due to motion artifact.  EEG is not suggestive of epilepsy.  Further clinical correlation is needed.     NEUROPSYCHOLOGICAL EVALUATION 4/1/2021  Major Vascular Neurocognitive Disorder     Adjustment Disorder with Mixed Anxiety and Depressed Mood     RECOMMENDATIONS:  1) Continued neurologic follow up is recommended for ongoing monitoring and treatment. Consider a trial of a cholinesterase inhibitor (e.g., donepezil) if not medically contraindicated, as this medication has been shown to benefit individuals with vascular cognitive impairment.     2) Ongoing participation in speech therapy (cognitive rehabilitation) is strongly encouraged. The patient appears to be benefiting from this in many ways.     3) Additional emotional support is also strongly recommended. I have provided Mr. Dueñas and his wife with various support groups in the community (for survivors and/or loved ones). I have also discussed with him a referral to see our psychologist for individual psychotherapy to help him process, cope, and even come to accept some of the losses he has endured as a result of his CVAs. Mr. Dueñas is not interested in either of these suggestions at this time, but asked that I mail him these resources in case he changes his mind. He does get some additional support from his Christian.     4) Consider an adjustment to his antidepressant medication regimen (either increasing the dosage or trying  an alternative) to help better manage his emotional symptoms.     5) Mr. Dueñas is encouraged to adhere closely to his medication regimen in general, in order to keep his cerebrovascular risk factors (e.g., high blood pressure and high cholesterol) well managed. This may help to prevent future CVAs and additional cognitive decline.     6) If not already done, completion of paperwork for advance directives and assignment of healthcare and financial Power of  should be considered at this time.     7) Mr. Dueñas is in a living situation in which most of his complex daily activities (such as driving and financial management) are arranged and managed for him. This all remains appropriate. Thus, he is quite fortunate in that, despite the evidence of cognitive impairment, his current independent living situation appears to be working well for him.     8) Mr. Dueñas is encouraged to remain physically, socially, and mentally active in order to optimize his brain health.     9) Neuropsychological follow-up is recommended in approximately 12 months (or as clinically indicated) in order to monitor his cognitive status and update recommendations. The current test data can be used as a baseline to which future comparisons can be made.     PERTINENT LABS  Following labs were reviewed:  No visits with results within 3 Month(s) from this visit.   Latest known visit with results is:   Ambulatory - [facility] on 04/20/2021   Component Date Value     Bilirubin Total 04/20/2021 0.6      Bilirubin Direct 04/20/2021 0.3      Protein Total 04/20/2021 6.0      Albumin 04/20/2021 4.0      Alkaline Phosphatase 04/20/2021 57      AST 04/20/2021 23      ALT 04/20/2021 36      Vitamin B1 Whole Blood L* 04/20/2021 150      Methylmalonic Acid 04/20/2021 0.23      Folic Acid 04/20/2021 13.7      Treponema Antibody Total 04/20/2021 Negative          Total time spent for face to face visit, reviewing labs/imaging studies, counseling and  coordination of care was: 30 Minutes spent on the date of the encounter doing chart review, review of outside records, review of test results, interpretation of tests, patient visit and documentation       This note was dictated using voice recognition software.  Any grammatical or context distortions are unintentional and inherent to the software.    Orders Placed This Encounter   Procedures     Hepatic function panel      New Prescriptions    No medications on file     Modified Medications    Modified Medication Previous Medication    DONEPEZIL (ARICEPT) 10 MG TABLET donepezil (ARICEPT) 10 MG tablet       Take 1 tablet (10 mg) by mouth At Bedtime    TAKE 1 TABLET BY MOUTH EVERYDAY AT BEDTIME                     Again, thank you for allowing me to participate in the care of your patient.        Sincerely,        Tom Rivera MD

## 2022-07-01 NOTE — NURSING NOTE
Chief Complaint   Patient presents with     Follow Up     1 year     Sandy Tate MA,CMA,9:42 AM   MOCA: 13         YUKO COGNITIVE ASSESSMENT (MOCA)  Version 7.1 Original Version  VISUOSPATIAL/EXECUTIVE               COPY CUBE      [   1 ]                                [  1  ] DRAW CLOCK (Ten past eleven)  (3 points)    [  1  ]                    [ 1   ]               [  0  ]       Contour            Numbers     Hands POINTS                  4 / 5   NAMING    [ 1  ]                                                                        [  0  ]                                             [  1  ]  Lion                                                                      Rhinoceros                                Camel                     2/ 3   MEMORY Read list of words, subject must repeat them. Do 2 trials, even if 1st trial is successful. Do a recall after 5 minutes  FACE VELVET Sabianism OSMANI RED No Points    1st          2nd         ATTENTION Read list of digits (1 digit/sec) Subject has to repeat in the forward order       [  1  ]   2  1  8  5  4                                [ 1   ] 7 4 2                         2 /2   Read list of letters. The subject must tap with his hand at each letter A. No points if > 2 errors.  [  0  ] F B A C M N A A J K L B A F A K D E A A A J A M O F A A B             0 /1   Serial 7 subtraction starting at 100          [  0  ] 93         [  0  ] 86          [  0  ] 79          [   0 ] 72         [  0  ] 65   4 or 5 correct subtractions: 3 points,  2 or 3 correct: 2 points,  1correct: 1 point,   0 correct: 0 points          0  /3   LANGUAGE Repeat: I only know that Too is the one to help today. [  1   ]                                      The cat always hid under the couch when dogs were in the room. [ 0  ]              4 /2   Fluency: Name maximum number of words in one minute that begin with the letter F                                                                                                                     [  0  ] __9_ (N > 11 words)              0 /1   ABSTRACTION Similarity between e.g. banana-orange=fruit                                                                   [  0  ] train-bicycle                      [  0  ] watch-ruler             0 /2   DELAYED  RECALL Has to recall words  WITH NO CUE FACE  [  0  ] VELVET  [ 0   ] Christian  [ 0   ]  OSMANI  [  0  ] RED  [  0  ] Points for UNCUED recall only           0 /5           OPTIONAL Category cue           Multiple choice cue          ORIENTATION  [ 1   ] Date     [ 1   ] Month       [  0  ] Year      [  1  ] Day      [  1  ] Place        [   0 ] City        4  /6   TOTAL  Normal > 26/30 Add 1 point if < 12 years education        /30                     Sandy Tate MA,[facility],9:59 AM

## 2022-10-04 NOTE — NURSING NOTE
Student's Name:   Mary Aaron Birth Date  2006  Sex  male  Race/Ethnicity   School/Grade Level/ID#     Address:   [place] [address]  Parent/Guardian             Telephone#                                            Home:                               Work:   IMMUNIZATIONS: To be completed by health care provider. The mo/da/yr for every dose administered is required. If a specific vaccine is medically contraindicated, a separate written statement must be attached by the health care responsible for completing the health examination explaining the medical reason for the contraindication.      Immunization History   Administered Date(s) Administered   • DTaP(INFANRIX) 06/02/2006, 09/18/2009   • DTaP/Hep B/IPV 2006, 2006, 2005   • DTaP/IPV 11/05/2009   • HIB, Unspecified Formulation 2005, 2005, 06/02/2006   • Hep A, Pediatric, Unspecified Formulation 02/06/2007, 11/14/2008   • Hep A, Unspecified formulation 11/14/2008, 10/28/2011   • Hep A, ped/adol, 2 dose 10/28/2011   • Hep B, Unspecified Formulation 2005   • Hep B, adolescent or pediatric 2005, 09/18/2009   • Hep B, adult 2005   • Hib (PRP-OMP) 2006, 2006, 06/02/2006   • Influenza Live, Intranasal 10/28/2011   • Influenza, Split 11/14/2008, 10/04/2010   • Influenza, Unspecified Formulation 11/13/2007   • Influenza, intranasal, quadrivalent, live (FLUMIST) 11/14/2008, 10/04/2010, 10/28/2011   • Influenza, seasonal, injectable, trivalent 2005, 2005, 11/27/2006, 10/24/2009   • MMR 06/02/2006, 11/05/2009   • Meningococcal Conjugate MCV4P (Menactra) 02/17/2016   • Novel Influenza W0T1-72 11/05/2009   • Novel Influenza K1S9-49, Nasal 12/30/2009   • PPD 01/20/2006   • Pneumococcal Conjugate 7 Valent 2005, 2005, 2005, 01/20/2006   • Rotavirus - monovalent 09/18/2009   • Tdap 02/17/2016   • Varicella 01/20/2006, 11/05/2009   Pended Date(s) Pended   •  Chief Complaint   Patient presents with     Intracranial hemorrhage     3 month follow up     Aura Felipe CMA on 2/8/2021 at 9:35 AM     Meningococcal Conjugate MCV4O (Menveo) 10/04/2022      Immunizations given 10/4/2022: Meningitis      Health care provider (MD, , APN, PA, school health professional, health official) verifying above immunization history must sign below. If adding dates to the above immunization history section, put your initials by date(s) and sign here.)  Signature                                                                 Title                                                Date  _____________________________________________________________________________________  Signature                                                                 Title                                                Date  _____________________________________________________________________________________   ALTERNATIVE PROOF OF IMMUNITY   1. Clinical diagnosis (measles, mumps, hepatitis B) is allowed when verified by physician and supported with lab confirmation.  Attach copy of lab result.    * MEASLES (Rubeola)  MO   DA  YR          **MUMPS  MO   DA  YR             HEPATITIS B  MO   DA   YR           VARICELLA  MO DA YR      2. History of varicella (chickenpox) disease is acceptable if verified by health care provider, school health professional or health official.  Person signing below verifies that the parent/guardian’s description of varicella disease history is indicative of past infection and is accepting such history as documentation of disease.  Date of Disease                                  Signature                                                           Title   3. Laboratory Evidence of Immunity (check one)      __ Measles*         __ Mumps**        __ Rubella        __Varicella    (Attach copy of lab result)         *All measles cases diagnosed on or after July 1, 2002, must be confirmed by laboratory evidence.      **All mumps cases diagnosed on or after July 1, 2013, must be confirmed by laboratory evidence.     Completion of  Alternative 1 or 3 MUST be accompanied by Labs & Physician Signature: ___________________________  Physician Statements of Immunity MUST be submitted to ID for review.     Certificates of Spiritism Exemption to Immunizations or Physician Medical Statements of Medical Contraindication Are Reviewed   and Maintained by the School Authority     (11/2015)                                         (COMPLETE BOTH SIDES)                                  Approved [id number]    HEALTH HISTORY      TO BE COMPLETED AND SIGNED BY PARENT/GUARDIAN AND VERIFIED BY HEALTH CARE PROVIDER  ALLERGIES: (Food, drug, insect, other) has No Known Allergies.   MEDICATION: (List all prescribed or taken on a regular basis.) has a current medication list which includes the following prescription(s): mupirocin (BACTROBAN) 2 % ointment and cefadroxil (DURICEF) 500 MG capsule.   Diagnosis of asthma?  Child wakes during night coughing? Yes    No  Yes    No  Loss of function of one of paired  organs?(eye/ear/kidney/testicle) Yes    No    Birth defects? Yes    No  Hospitalizations? Yes    No    Developmental delay? Yes    No   When? What for? Yes    No    Blood disorders? Hemophilia,  Sickle Cell, Other? Explain. Yes    No  Surgery? (List all.)  When? What for? Yes    No    Diabetes? Yes    No  Serious injury or illness? Yes    No    Head injury/Concussion/Passed out? Yes    No  TB skin test positive (past/present)? * Yes    No *If yes, refer to local [facility] dept   Seizures? What are they like?  Yes    No  TB disease (past or present)? * Yes    No *If yes, refer to local [facility] dept   Heart problem/Shortness of breath?  Yes    No  Tobacco use (type, frequency)?  Yes    No    Heart murmur/High blood pressure? Yes    No  Alcohol/Drug use?  Yes    No    Dizziness or chest pain with exercise? Yes    No  Family history of sudden death  before age 50? (Cause?) Yes    No    Eye/Vision problems? ______           __Glasses          __Contacts  Last exam  by eye doctor ______  Other concerns? (crossed eye, drooping lids, squinting, difficulty reading) Dental?   __ Braces     __ Bridge     __ Plate   __Other   Ear/Hearing problems? Yes    No  Information may be shared with appropriate personnel for health and educational purposes.   Bone/Joint problem/injury/scoliosis? Yes    No  Parent/Guardian  Signature                                               Date   PHYSICAL EXAMINATION REQUIREMENTS   Entire section below to be completed by MD/DO/APN/PA Head Circumference if <2-3 years old - BP (!) 125/51 (BP Location: LUE - Left upper extremity, Patient Position: Sitting, Cuff Size: Regular)   Pulse 61   Ht 5' 9.5\" (1.765 m)   Wt 68.4 kg (150 lb 12.7 oz)   BMI 21.95 kg/m²   BSA 1.84 m²    53 %ile (Z= 0.08) based on CDC (Boys, 2-20 Years) BMI-for-age based on BMI available as of 10/4/2022.   DIABETES SCREENING (NOT REQUIRED FOR ) BMI>85% age/sex: No     And any two of the following: Family History: No  Ethnic Minority: No    Signs of Insulin Resistance (hypertension, dyslipidemia, polycystic ovarian syndrome, acanthosis nigricans): No  At Risk: No   LEAD RISK QUESTIONNAIRE Required for children age 6 months through 6 years enrolled in licensed or public school operated day care, , nursery school and/or . (Blood test required if resides in [place] or high risk zip code.)  Questionnaire Administered? No  Blood Test Indicated? No   Blood Test Date/Result:    TB SKIN OR BLOOD TEST Recommended only for children in high-risk groups including children immunosuppressed due to HIV infection or other conditions, frequent travel to or born in high prevalence countries or those exposed to adults in high-risk categories. See CDC guidelines. http://www.cdc.gov/tb/publications/factsheets/testing/TB_testing.htm.  Test Needed: No     Test performed: No                          Skin Test:    Date Read              /      /             Result:   Positive__       Negative__        mm________                          Blood Test: Date Reported       /      /               Result:  Positive__      Negative__      Value________  LAB TESTS (recommended) Date/Result  Date/Results   Hemoglobin or Hematocrit  Sickle Cell (when indicated)    Urinalysis NA Developmental Screening Tool Normal - ASQ        SYSTEM REVIEW Normal  Comments/Follow-up/Needs  Normal Comments/Follow-up/Needs   Skin  Yes  Endocrine Yes    Ears Yes                  Screening Result Gastrointestinal Yes    Eyes Yes                  Screening Result: Genito-Urinary Yes                                      LMP:    Nose Yes  Neurologic Yes    Throat Yes  Musculoskeletal Yes    Mouth/Dental Yes  Spinal Exam Yes    Cardiovascular/HTN Yes  Nutritional status Yes    Respiratory Yes Diagnosis of Asthma: No   Mental Health Yes    Currently Prescribed Asthma Medication:        No  Quick-relief medication (e.g. Short Acting Beta Antagonist)        No  Controller medication (e.g. inhaled corticosteroid) Other     NEEDS/MODIFICATIONS required in the school setting: No restrictions DIETARY Needs/Restrictions: No restrictions   SPECIAL INSTRUCTIONS/DEVICES e.g. safety glasses, glass eye, chest protector for arrhythmia, pacemaker, prosthetic device, dental bridge, false teeth, athletic support/cup: No restrictions   MENTAL HEALTH/OTHER Is there anything else the school should know about this student?  If you would like to discuss this student’s health with school or school health personnel:  Not needed   EMERGENCY ACTION needed while at school due to child’s health condition (e.g. ,seizures, asthma, insect sting, food, peanut allergy, bleeding problem, diabetes, heart problem)?   No   On the basis of the examination on this day, I approve this child’s participation in                                (If No or Modified please attach explanation.)  PHYSICAL EDUCATION:  Yes                               [employer]    Needs further eval    Print Name  Taras MajorDO         Signature                                                                       Date: 10/4/2022   Address:  [facility] [address]  [facility] [address] [id number] [phone]         (Complete Both Sides)     Approved [facility] 3/2016

## 2023-06-10 DIAGNOSIS — F01.50 VASCULAR DEMENTIA WITHOUT BEHAVIORAL DISTURBANCE (H): ICD-10-CM

## 2023-06-12 RX ORDER — DONEPEZIL HYDROCHLORIDE 10 MG/1
TABLET, FILM COATED ORAL
Qty: 30 TABLET | Refills: 0 | Status: SHIPPED | OUTPATIENT
Start: 2023-06-12 | End: 2023-07-05

## 2023-06-12 NOTE — TELEPHONE ENCOUNTER
Refill request for: donepezil 10mg   Directions:Take 1 tablet (10 mg) by mouth At Bedtime      LOV: 07/01/22  NOV: 07/05/23    30 day supply with 0 refills Medication T'd for review and signature    Briana Steve LPN on 6/12/2023 at 11:48 AM

## 2023-07-01 NOTE — PROGRESS NOTES
NEUROLOGY FOLLOW UP VISIT  NOTE       [facility] NEUROLOGY [place]  [address] [place] [address]  Tel: [phone]  Fax: [phone]  [email or web address]     John Dueñas,  1945, MRN [id number]  PCP: Malcolm Sun  Date: 2023      ASSESSMENT & PLAN     Visit Diagnosis  Vascular dementia without behavioral disturbance (H)     Vascular dementia  78-year-old male with history of prior ICH, HTN, HLD with vascular dementia without behavioral disturbances.  His MoCA continues to decline and he scored 13/30 today.  I have recommended:    1.  Continue Aricept 10 mg daily and add Namenda 5 mg daily, gradually increasing it to 10 mg twice daily  2.  Check hepatic profile  3.  Patient was told to stop driving  4.  Continue citalopram  5.  He was told to remain socially active, do mental exercises and eat Mediterranean diet  6.  Add vitamin E 1000 units twice daily    Thank you again for this referral, please feel free to contact me if you have any questions.    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  He scored 15/30 on MoCA during his last visit and was continued on Aricept.  His MoCA score is 13/20 today previously patient was on Keppra following his intracerebral hemorrhage but as he remained symptom-free it was discontinued.  According to patient his condition is stable.  Wife feels there has been a steady decline.  He does struggle with depression and takes citalopram.  He continues to drive although I had advised previously to stop driving.    PQRS IN DEMENTIA   Measure #47: Advanced care plan or surrogate decision maker documented: YES   Advanced care plan discussed but patient did not wish  or was not able to name a surrogate decision maker: YES/NO/ NA     Measure #280: Staging of dementia by MMSE at least once within a 12 month period   Mild dementia: MMSE > 18   Moderate dementia: MMSE 10-18   Severe dementia: MMSE<10     Measure #281: Cognitive assessment performed and results reviewed at least once within 12 month period   Mini-Mental state examination (MMSE), [facility] mental status examination (UMS) or formal neuropsychological evaluation   MoCA score 13/25    Measure #282: Functional status assessment performed and results reviewed at least once within 12 month period   Able to perform instrumental activities of daily living (IADL): YES   Able to perform basic activities of daily living(ADL): YES    Measure #283: Neuropsychiatric symptom assessment performed and results reviewed at least once within 12 months   Dementia signs and symptoms (DSS) scale:   Neuropsychiatric symptoms include, agitation, wandering, purposeless hyperactivity, verbal or physical aggressiveness, resistive numbness with care, apathy, impulsiveness, socially inappropriate behaviors, eating disturbances, sleep problems, repetitive behavior, anxiety, dysphoria, euphoria, irritability, delusions, hallucinations, paranoia     Measure #284: Management of neuropsychiatric symptoms at least once within 12 month period   One or more neuropsychiatric symptoms: YES  Neuropsychiatric intervention ordered:NO     Measure #285: Screening for depressive symptoms YES    Measure #286: Counseling regarding safety concerns,at least once within 12 month period: YES  Referring for home safety evaluation: NO   Educational material provided: YES  Safety concerns include, fall risk, gait instability, medication management, financial management, home safety risk from cooking/smoking, physical expression to self or others, wandering, access to firearms or other weapons, being left alone in home, inability to respond to emergencies,  driving, operation of hazardous equipment, abuse or neglect.     Measure #287: Counseling regarding risk of driving,at least once within 12 month period: YES   Drivers evaluation NO     Measure #288: Caregiver education and support, at least once within 12 month period: NO   Advised caregiver that he/she is at increased risk of serious illness including heart condition, hypertension, respiratory disease and circulatory diseases.        PROBLEM LIST   Patient Active Problem List   Diagnosis Code     Hypercholesteremia E78.00     Lumbar disc herniation with radiculopathy M51.16     Myopia H52.10     Impaired fasting glucose R73.01     Malignant essential hypertension I10     Advance care planning Z71.89     Depression with anxiety F41.8     Mixed type age-related cataract, both eyes H25.813     Vascular dementia (H) F01.50         PAST MEDICAL & SURGICAL HISTORY     Past Medical History:   Patient  has a past medical history of Herniated disc, Intracerebral hemorrhage, nontraumatic (H) (6/13/2020), Intracranial hemorrhage (H) (8/6/2020), SAH (subarachnoid hemorrhage) (H) (6/14/2020), and Subdural hematoma (H) (6/24/2020).    Surgical History:  He  has a past surgical history that includes tonsillectomy and Tonsillectomy (1950).     SOCIAL HISTORY     Reviewed, and he  reports that he has never smoked. He has never used smokeless tobacco. He reports that he does not currently use alcohol.     FAMILY HISTORY     Reviewed, and family history includes Chronic Obstructive Pulmonary Disease in his father; Diabetes in his maternal grandfather; Heart Disease in his brother; Multiple myeloma in his mother.     ALLERGIES     Allergies   Allergen Reactions     Ether Other (See Comments)     Lactose Other (See Comments)         REVIEW OF SYSTEMS     A 12 point review of system was performed and was negative except as outlined in the history of present illness.     HOME MEDICATIONS     Current Outpatient Rx   Medication Sig  "Dispense Refill     acetaminophen (TYLENOL) 500 MG tablet Take 1 tablet by mouth       calcium citrate-vitamin D (CITRACAL) 315-250 MG-UNIT TABS per tablet Take 2 tablets by mouth       cholecalciferol (VITAMIN D-1000 MAX ST) 25 MCG (1000 UT) TABS Take 1,000 Units by mouth       donepezil (ARICEPT) 10 MG tablet Take 1 tablet (10 mg) by mouth At Bedtime 90 tablet 3     escitalopram (LEXAPRO) 10 MG tablet Take 10 mg by mouth At Bedtime        Glucosamine-Chondroit-Vit C-Mn (GLUCOSAMINE CHONDROITINOITIN COMPLEX) CAPS        lisinopril (ZESTRIL) 10 MG tablet TAKE 1 TABLET BY MOUTH EVERY EVENING       melatonin 5 MG tablet        [START ON 8/5/2023] memantine (NAMENDA) 10 MG tablet Take 1 tablet (10 mg) by mouth 2 times daily 180 tablet 3     memantine (NAMENDA) 5 MG tablet 1 PO every day x 7 days, then 1 PO BID x 7 days, then 2 PO Q am & 1 PO at bedtime x 7 days, then 2 PO BID thereafter 70 tablet 0     metoprolol succinate ER (TOPROL-XL) 50 MG 24 hr tablet Take 50 mg by mouth       multivitamin w/minerals (THERA-VIT-M) tablet Take 1 tablet by mouth       senna-docusate (SENOKOT-S/PERICOLACE) 8.6-50 MG tablet Take 2 tablets by mouth       tamsulosin (FLOMAX) 0.4 MG capsule Take 0.4 mg by mouth       tiZANidine (ZANAFLEX) 4 MG tablet TAKE 1 TABLET BY MOUTH AT BEDTIME AS NEEDED FOR MUSCLE SPASM       triamcinolone (KENALOG) 0.025 % cream Apply topically 2 times daily       vitamin B-12 (CYANOCOBALAMIN) 1000 MCG tablet Take 1,000 mcg by mouth        vitamin E (TOCOPHEROL) 1000 units (450 mg) CAPS capsule Take 1 capsule (1,000 Units) by mouth 2 times daily 180 capsule 3     magnesium oxide (MAG-OX) 400 MG tablet Take 1 tablet by mouth daily at 2 pm           PHYSICAL EXAM     Vital signs  /66 (BP Location: Right arm, Patient Position: Sitting)   Pulse 57   Ht 1.778 m (5' 10\")   Wt 74.8 kg (165 lb)   BMI 23.68 kg/m      Weight:   165 lbs 0 oz  Chavo Cognitive Assessment:    Chavo Cognitive Assessment " (MOCA)  Visuospatial/Executive : 3  Namin  Attention - Digits: 1  Attention - Letters: 0  Attention - Subtraction: 0  Language - Repeat: 0  Language - Fluency : 0  Abstraction: 2  Delayed Recall: 2  Orientation: 3  Education: 0  MOCA Score: 13  Administered by: : Aura Felipe CMA     Chavo Cognitive Assessment Score:  MOCA Score: .      GENERAL PHYSICAL EXAM: Patient is alert and oriented x 4 in no acute distress. Neck was supple, no carotid bruits, thyromegaly, lymphadenopathy or JVD noted.   NEUROLOGICAL EXAM:  Patient is alert and oriented times 3 he can tell me his name date and place.  He scored 13/30 on MoCA today compared to 1530 last year.  He can tell me the name of the president but he struggles with serial 7.  Pupil equal round and reactive face symmetrical tongue midline.  He has right pronator drift.  Strength on the right 5/5 on the left 5/5.  Reflexes 2+ on the right 1+ on the left he has some dysmetria on finger-nose testing he has a wide-based gait and somewhat unstable with decreased arm swing       PERTINENT DIAGNOSTIC STUDIES     Following studies were reviewed:     CT BRAIN 2020  1.  4.9 x 5.4 x 5.7 cm intraparenchymal hemorrhage centered in the left parietal lobe and extending to the left periatrial region.  There is a small surrounding rim of vasogenic type edema. Consider contrast enhanced brain MRI for further evaluation if   the patient is clinically able to get MRI.  2.  It does have local mass effect with partial effacement of the atrium of the left lateral ventricle. 2.4 mm midline shift to the right at the level of the lateral ventricles.   3.  3.4 mm subdural hematoma along the posterior left frontal convexity.     MRI, MRA brain 2021  MRI BRAIN:  1. No finding for acute infarct, acute hemorrhage, or mass.     2. Area of chronic encephalomalacic change, hemosiderin staining, and gliosis is seen along the left frontoparietal junction corresponding to the site  of prior parenchymal hemorrhage.  2. Moderate to marked dilatation of the lateral ventricles, stable compared to prior.     MRA [place] [facility] [place]:  1. No vascular cutoff from aneurysm, or flow-limiting stenosis.     MRI BRAIN 12/1/2020  1.  Chronic parenchymal hematoma within the left temporoparietal lobes with encephalomalacia and volume loss. Irregular central T2 hypointensity and enhancement likely relates to scarring, no definite underlying mass. Continued follow-up recommended to  establish longer-term stability. Consider vascular imaging if not previously performed to more definitively exclude vascular etiology of hemorrhage.     MRI BRAIN 8/31/2020  1.  Previously described intraparenchymal hematoma in the left parietal lobe with extension to the left temporoparietal region has markedly decreased in size since previous, now measuring 3.0 x 1.2 cm in AP x transverse dimensions. This primarily   involves the lateral left parietal lobe with slight extension to the left frontoparietal region and is accompanied by hemosiderin staining extending into the left temporoparietal region. While there is a small amount of FLAIR hyperintense diffusion   restriction compatible with acute to very early subacute infarct along the anterior and medial margin of the above-described residual intraparenchymal hemorrhage in the left parietal lobe, this presumably relates to an evolving parenchymal injury from   the above-described intraparenchymal hematoma and is accompanied by a background of adjacent FLAIR hyperintense presumed gliosis. Without contrast, evaluation for an abnormal underlying mass is limited. Follow-up contrast-enhanced brain MRI after   resolution of the above-described residual intraparenchymal hemorrhage will be of benefit to exclude an underlying mass.     2.  Previously described subarachnoid hemorrhage and intraventricular extension of hemorrhage have resolved in the interim.     3.  Hemosiderin staining in  the left parietal lobe and left temporoparietal region is accompanied by a small amount of hemosiderin staining along the atrium and occipital horn of the left lateral ventricle.     4.  Unchanged age-related changes, as above.     MRI BRAIN 6/13/2020  1.  Large left parietal parenchymal hemorrhage again noted. It is grossly unchanged in size. There is new intraventricular extension layering in the occipital horns and left atrium lateral ventricle compared to the prior study. Temporal horns are   slightly more prominent on the prior examination. Close interval follow-up suggested. Overall mass effect and midline shift is similar to the prior examination.     2.  There is multifocal stippled and somewhat nodular enhancement around the periphery of the parietal hemorrhage as detailed above. No definite focal masslike enhancement is identified. This may represent vascular structures and can occasionally be seen   in active bleeding.     3.  Stable thin left convexity subdural hematoma.     4.  Small volume subarachnoid hemorrhage over the right frontal operculum and temporal convexity.     5.  Overall, the constellation of findings is nonspecific. Consider amyloid angiopathy. Given the stable abnormal enhancement, a component of cerebral amyloid angiopathy related inflammation is not excluded though considered less likely.     EEG 4/19/2021  Diffusely slow more so on the left side.  No sharp activity arrhythmic discharges seen to suggest seizures     EEG  6/14/2020  This is a normal EEG during wakefulness and drowsiness except due to mild background dysrhythmia and the EEG being limited due to motion artifact.  EEG is not suggestive of epilepsy.  Further clinical correlation is needed.     NEUROPSYCHOLOGICAL EVALUATION 4/1/2021  Major Vascular Neurocognitive Disorder     Adjustment Disorder with Mixed Anxiety and Depressed Mood     RECOMMENDATIONS:  1) Continued neurologic follow up is recommended for ongoing  monitoring and treatment. Consider a trial of a cholinesterase inhibitor (e.g., donepezil) if not medically contraindicated, as this medication has been shown to benefit individuals with vascular cognitive impairment.     2) Ongoing participation in speech therapy (cognitive rehabilitation) is strongly encouraged. The patient appears to be benefiting from this in many ways.     3) Additional emotional support is also strongly recommended. I have provided Mr. Dueñas and his wife with various support groups in the community (for survivors and/or loved ones). I have also discussed with him a referral to see our psychologist for individual psychotherapy to help him process, cope, and even come to accept some of the losses he has endured as a result of his CVAs. Mr. Dueñas is not interested in either of these suggestions at this time, but asked that I mail him these resources in case he changes his mind. He does get some additional support from his [employer].     4) Consider an adjustment to his antidepressant medication regimen (either increasing the dosage or trying an alternative) to help better manage his emotional symptoms.     5) Mr. Dueñas is encouraged to adhere closely to his medication regimen in general, in order to keep his cerebrovascular risk factors (e.g., high blood pressure and high cholesterol) well managed. This may help to prevent future CVAs and additional cognitive decline.     6) If not already done, completion of paperwork for advance directives and assignment of healthcare and financial Power of  should be considered at this time.     7) Mr. Dueñas is in a living situation in which most of his complex daily activities (such as driving and financial management) are arranged and managed for him. This all remains appropriate. Thus, he is quite fortunate in that, despite the evidence of cognitive impairment, his current independent living situation appears to be working well for him.     8)   Leana is encouraged to remain physically, socially, and mentally active in order to optimize his brain health.     9) Neuropsychological follow-up is recommended in approximately 12 months (or as clinically indicated) in order to monitor his cognitive status and update recommendations. The current test data can be used as a baseline to which future comparisons can be made       PERTINENT LABS  Following labs were reviewed:  No visits with results within 3 Month(s) from this visit.   Latest known visit with results is:   Lab on 07/01/2022   Component Date Value Ref Range Status     Bilirubin Total 07/01/2022 0.8  0.0 - 1.0 mg/dL Final     Bilirubin Direct 07/01/2022 0.3  <=0.5 mg/dL Final     Protein Total 07/01/2022 5.8 (L)  6.0 - 8.0 g/dL Final     Albumin 07/01/2022 3.7  3.5 - 5.0 g/dL Final     Alkaline Phosphatase 07/01/2022 64  45 - 120 U/L Final     AST 07/01/2022 28  0 - 40 U/L Final     ALT 07/01/2022 41  0 - 45 U/L Final         Total time spent for face to face visit, reviewing labs/imaging studies, counseling and coordination of care was: 30 Minutes spent on the date of the encounter doing chart review, review of outside records, review of test results, interpretation of tests, patient visit, documentation and discussion with family     This note was dictated using voice recognition software.  Any grammatical or context distortions are unintentional and inherent to the software.    Orders Placed This Encounter   Procedures     Hepatic function panel      New Prescriptions    MEMANTINE (NAMENDA) 10 MG TABLET    Take 1 tablet (10 mg) by mouth 2 times daily    MEMANTINE (NAMENDA) 5 MG TABLET    1 PO every day x 7 days, then 1 PO BID x 7 days, then 2 PO Q am & 1 PO at bedtime x 7 days, then 2 PO BID thereafter    VITAMIN E (TOCOPHEROL) 1000 UNITS (450 MG) CAPS CAPSULE    Take 1 capsule (1,000 Units) by mouth 2 times daily     Modified Medications    Modified Medication Previous Medication    DONEPEZIL  (ARICEPT) 10 MG TABLET donepezil (ARICEPT) 10 MG tablet       Take 1 tablet (10 mg) by mouth At Bedtime    TAKE 1 TABLET BY MOUTH EVERYDAY AT BEDTIME

## 2023-07-05 ENCOUNTER — OFFICE VISIT (OUTPATIENT)
Dept: NEUROLOGY | Facility: CLINIC | Age: 78
End: 2023-07-05
Payer: COMMERCIAL

## 2023-07-05 ENCOUNTER — LAB (OUTPATIENT)
Dept: LAB | Facility: HOSPITAL | Age: 78
End: 2023-07-05
Payer: COMMERCIAL

## 2023-07-05 VITALS
SYSTOLIC BLOOD PRESSURE: 134 MMHG | WEIGHT: 165 LBS | BODY MASS INDEX: 23.62 KG/M2 | HEART RATE: 57 BPM | HEIGHT: 70 IN | DIASTOLIC BLOOD PRESSURE: 66 MMHG

## 2023-07-05 DIAGNOSIS — F01.50 VASCULAR DEMENTIA WITHOUT BEHAVIORAL DISTURBANCE (H): ICD-10-CM

## 2023-07-05 DIAGNOSIS — F01.50 VASCULAR DEMENTIA WITHOUT BEHAVIORAL DISTURBANCE (H): Primary | ICD-10-CM

## 2023-07-05 LAB
ALBUMIN SERPL BCG-MCNC: 4.2 G/DL (ref 3.5–5.2)
ALP SERPL-CCNC: 66 U/L (ref 40–129)
ALT SERPL W P-5'-P-CCNC: 29 U/L (ref 0–70)
AST SERPL W P-5'-P-CCNC: 31 U/L (ref 0–45)
BILIRUB DIRECT SERPL-MCNC: <0.2 MG/DL (ref 0–0.3)
BILIRUB SERPL-MCNC: 0.6 MG/DL
PROT SERPL-MCNC: 6.3 G/DL (ref 6.4–8.3)

## 2023-07-05 PROCEDURE — 80076 HEPATIC FUNCTION PANEL: CPT

## 2023-07-05 PROCEDURE — 99214 OFFICE O/P EST MOD 30 MIN: CPT | Performed by: PSYCHIATRY & NEUROLOGY

## 2023-07-05 PROCEDURE — 36415 COLL VENOUS BLD VENIPUNCTURE: CPT

## 2023-07-05 RX ORDER — DONEPEZIL HYDROCHLORIDE 10 MG/1
10 TABLET, FILM COATED ORAL AT BEDTIME
Qty: 90 TABLET | Refills: 3 | Status: SHIPPED | OUTPATIENT
Start: 2023-07-05 | End: 2024-06-21

## 2023-07-05 RX ORDER — MAGNESIUM OXIDE 400 MG/1
1 TABLET ORAL
COMMUNITY
Start: 2023-06-10

## 2023-07-05 RX ORDER — MULTIVIT WITH MINERALS/LUTEIN
1000 TABLET ORAL 2 TIMES DAILY
Qty: 180 CAPSULE | Refills: 3 | Status: SHIPPED | OUTPATIENT
Start: 2023-07-05 | End: 2024-07-11

## 2023-07-05 RX ORDER — TRIAMCINOLONE ACETONIDE 0.25 MG/G
CREAM TOPICAL 2 TIMES DAILY
COMMUNITY

## 2023-07-05 RX ORDER — MEMANTINE HYDROCHLORIDE 5 MG/1
TABLET ORAL
Qty: 70 TABLET | Refills: 0 | Status: SHIPPED | OUTPATIENT
Start: 2023-07-05 | End: 2023-08-05

## 2023-07-05 RX ORDER — MEMANTINE HYDROCHLORIDE 10 MG/1
10 TABLET ORAL 2 TIMES DAILY
Qty: 180 TABLET | Refills: 3 | Status: SHIPPED | OUTPATIENT
Start: 2023-08-05 | End: 2024-06-21

## 2023-07-05 ASSESSMENT — MONTREAL COGNITIVE ASSESSMENT (MOCA)
11. FOR EACH PAIR OF WORDS, WHAT CATEGORY DO THEY BELONG TO (OUT OF 2): 2
VISUOSPATIAL/EXECUTIVE SUBSCORE: 3
WHAT IS THE TOTAL SCORE (OUT OF 30): 13
6. READ LIST OF DIGITS [FORWARD/BACKWARD]: 1
10. [FLUENCY] NAME WORDS STARTING WITH DESIGNATED LETTER: 0
4. NAME EACH OF THE THREE ANIMALS SHOWN: 2
WHAT LEVEL OF EDUCATION WAS ATTAINED: 0
13. ORIENTATION SUBSCORE: 3
8. SERIAL SUBTRACTION OF 7S: 0
7. [VIGILENCE] TAP WHEN HEARING DESIGNATED LETTER: 0
12. MEMORY INDEX SCORE: 2
9. REPEAT EACH SENTENCE: 0

## 2023-07-05 NOTE — NURSING NOTE
Chief Complaint   Patient presents with     Vascular dementia without behavioral disturbance     MOCA 13/30     Aura Felipe CMA on 7/5/2023 at 9:31 AM

## 2023-07-05 NOTE — NURSING NOTE
YUKO COGNITIVE ASSESSMENT (MOCA)  Version 7.1 Original Version  VISUOSPATIAL/EXECUTIVE               COPY CUBE      [1  ]                                [ 1   ] DRAW CLOCK (Ten past eleven)  (3 points)    [ 1   ]                    [ 0   ]               [  0 ]       Contour            Numbers     Hands POINTS              3     / 5   NAMING    [1   ]                                                                        [ 0   ]                                             [   1 ]  Livictor manuel Smiley                                Camel                 2    / 3   MEMORY Read list of words, subject must repeat them. Do 2 trials, even if 1st trial is successful. Do a recall after 5 minutes  FACE VELVET Confucianism OSMANI RED No Points    1st x x  x x     2nd x x x x x    ATTENTION Read list of digits (1 digit/sec) Subject has to repeat in the forward order       [ 1   ]   2  1  8  5  4                                [  0  ] 7 4 2                  1        /2   Read list of letters. The subject must tap with his hand at each letter A. No points if > 2 errors.  [ 0   ] F B A C M N A A J K L B A F A K D E A A A J A M O F A A B          0    /1   Serial 7 subtraction starting at 100          [ 0   ] 93         [ 0   ] 86          [  0  ] 79          [0    ] 72         [  0  ] 65   4 or 5 correct subtractions: 3 points,  2 or 3 correct: 2 points,  1correct: 1 point,   0 correct: 0 points       0     /3   LANGUAGE Repeat: I only know that Too is the one to help today. [ 0   ]                                      The cat always hid under the couch when dogs were in the room. [0   ]        0       /2   Fluency: Name maximum number of words in one minute that begin with the letter F                                                                                                                    [  0  ] _5__ (N > 11 words)          0     /1   ABSTRACTION  Similarity between e.g. banana-orange=fruit                                                                   [ 1   ] train-bicycle                      [ 1   ] watch-ruler          2    /2   DELAYED  RECALL Has to recall words  WITH NO CUE FACE  [ 0   ] VELVET  [ 0   ] Yazidism  [  1  ]  OSMANI  [ 1  ] RED  [ 0   ] Points for UNCUED recall only       2     /5           OPTIONAL Category cue           Multiple choice cue          ORIENTATION  [ 1   ] Date     [  1  ] Month       [ 0   ] Year      [  0  ] Day      [ 1   ] Place        [0    ] City       3   /6   TOTAL  Normal > 26/30 Add 1 point if < 12 years education    13 /30

## 2023-09-19 ENCOUNTER — TELEPHONE (OUTPATIENT)
Dept: NEUROLOGY | Facility: CLINIC | Age: 78
End: 2023-09-19
Payer: COMMERCIAL

## 2023-09-19 NOTE — TELEPHONE ENCOUNTER
[facility] Call Center    Phone Message    May a detailed message be left on voicemail: yes     Reason for Call: Other: Pt's cardiologist would like to know if pt can start taking 81 MG of Asprin daily?     Please fax answer to Dr. Irma FREDERICK [phone]      Action Taken: Message routed to: [facility]    Travel Screening: Not Applicable    Adam Cavazos on 9/19/2023 at 1:28 PM   - Neurology

## 2023-09-20 NOTE — TELEPHONE ENCOUNTER
Faxed response to Dr. Solis at [phone]   Aura Felipe CMA on 9/20/2023 at 11:02 AM  [facility]

## 2024-06-21 DIAGNOSIS — F01.50 VASCULAR DEMENTIA WITHOUT BEHAVIORAL DISTURBANCE (H): ICD-10-CM

## 2024-06-21 RX ORDER — DONEPEZIL HYDROCHLORIDE 10 MG/1
10 TABLET, FILM COATED ORAL AT BEDTIME
Qty: 90 TABLET | Refills: 0 | Status: SHIPPED | OUTPATIENT
Start: 2024-06-21 | End: 2024-07-11

## 2024-06-21 RX ORDER — MEMANTINE HYDROCHLORIDE 10 MG/1
10 TABLET ORAL 2 TIMES DAILY
Qty: 180 TABLET | Refills: 0 | Status: SHIPPED | OUTPATIENT
Start: 2024-06-21 | End: 2024-07-11

## 2024-06-21 NOTE — TELEPHONE ENCOUNTER
Refill request for: memantine 10mg   Directions: Take 1 tablet (10 mg) by mouth 2 times daily       Refill request for: donepezil 10mg   Directions: Take 1 tablet (10 mg) by mouth At Bedtime     LOV: 07/05/23  NOV: 07/11/24    90 day supply with 0 refills Medication T'd for review and signature    Briana Steve LPN on 6/21/2024 at 9:56 AM

## 2024-07-09 PROBLEM — I25.10 CORONARY ARTERY DISEASE DUE TO LIPID RICH PLAQUE: Status: ACTIVE | Noted: 2023-09-18

## 2024-07-09 PROBLEM — I25.83 CORONARY ARTERY DISEASE DUE TO LIPID RICH PLAQUE: Status: ACTIVE | Noted: 2023-09-18

## 2024-07-09 NOTE — PROGRESS NOTES
NEUROLOGY FOLLOW UP VISIT  NOTE       [facility] NEUROLOGY [place]  [address] [place] [address]  Tel: [phone]  Fax: [phone]  [email or web address]     John Dueñas,  1945, MRN [id number]  PCP: Malcolm Sun  Date: 2024      ASSESSMENT & PLAN     Visit Diagnosis  Vascular dementia without behavioral disturbance (H)     Vascular dementia without behavioral disturbance  79-year-old male with history of intracranial hemorrhage, HTN, HLD with vascular dementia.  He is currently on Aricept and Namenda and his MoCA score has remained stable (15/30 today).  During his last visit I had added vitamin E but for unclear reason he never got it filled.  I have recommended:    1.  Continue Aricept 10 mg daily and Namenda 10 mg twice daily.  Prescriptions were filled  2.  Vitamin E twice 1000 unit twice daily  3.  Hepatic panel  4.  Patient again wanted to know why he cannot drive and was told due to dementia he is at increased risk of accident and he should not drive  5.  Follow-up in 1 year    Thank you again for this referral, please feel free to contact me if you have any questions.    Tom Rivera MD  [facility] NEUROLOGY, [place]     HISTORY OF PRESENT ILLNESS     Patient is a 79-year-old male with history of intracranial hemorrhage, HTN, HLD last seen on 2023 for vascular dementia who returns for follow-up.  His last MoCA score was 13/30 and was told to continue Aricept and Namenda and vitamin E were added.  Additionally no change was made in the dose of Celexa.  He was told to stop driving.  He also struggles with depression and although felt his memory was stable wife reported that there was a steady decline.  He denies any new symptoms and although during his last visit I had prescribed a vitamin E for unclear reason he never got that filled.  His major complaint today is not being able to drive     PROBLEM LIST   Patient Active Problem List   Diagnosis     Hypercholesteremia    Lumbar disc herniation with radiculopathy    Myopia    Impaired fasting glucose    Malignant essential hypertension    Advance care planning    Depression with anxiety    Mixed type age-related cataract, both eyes    Vascular dementia (H)    Coronary artery disease due to lipid rich plaque         PAST MEDICAL & SURGICAL HISTORY     Past Medical History:   Patient  has a past medical history of Herniated disc, Intracerebral hemorrhage, nontraumatic (H) (6/13/2020), Intracranial hemorrhage (H) (8/6/2020), SAH (subarachnoid hemorrhage) (H) (6/14/2020), and Subdural hematoma (H) (6/24/2020).    Surgical History:  He  has a past surgical history that includes tonsillectomy and Tonsillectomy (1950).     SOCIAL HISTORY     Reviewed, and he  reports that he has never smoked. He has never used smokeless tobacco. He reports that he does not currently use alcohol.     FAMILY HISTORY     Reviewed, and family history includes Chronic Obstructive Pulmonary Disease in his father; Diabetes in his maternal grandfather; Heart Disease in his brother; Multiple myeloma in his mother.     ALLERGIES     Allergies   Allergen Reactions    Ether Other (See Comments)    Lactose Other (See Comments)         REVIEW OF SYSTEMS     A 12 point review of system was performed and was negative except as outlined in the history of present illness.     HOME MEDICATIONS     Current Outpatient Rx   Medication Sig Dispense Refill    acetaminophen (TYLENOL) 500 MG tablet Take 1 tablet by mouth      aspirin 81 MG EC tablet Take 81 mg by mouth daily      atorvastatin (LIPITOR) 20 MG tablet Take 1 tablet by mouth at bedtime      calcium citrate-vitamin D (CITRACAL) 315-250 MG-UNIT TABS per tablet Take 2 tablets by mouth      cholecalciferol (VITAMIN D-1000 MAX ST) 25 MCG (1000 UT) TABS Take 1,000 Units by mouth      donepezil (ARICEPT) 10 MG tablet TAKE 1 TABLET BY MOUTH EVERYDAY AT BEDTIME 90 tablet 0    escitalopram (LEXAPRO) 10 MG  "tablet Take 10 mg by mouth At Bedtime       Glucosamine-Chondroit-Vit C-Mn (GLUCOSAMINE CHONDROITINOITIN COMPLEX) CAPS       magnesium oxide (MAG-OX) 400 MG tablet Take 1 tablet by mouth daily at 2 pm      melatonin 5 MG tablet       memantine (NAMENDA) 10 MG tablet TAKE 1 TABLET BY MOUTH TWICE A  tablet 0    multivitamin w/minerals (THERA-VIT-M) tablet Take 1 tablet by mouth      tamsulosin (FLOMAX) 0.4 MG capsule Take 0.4 mg by mouth      triamcinolone (KENALOG) 0.025 % cream Apply topically 2 times daily      vitamin B-12 (CYANOCOBALAMIN) 1000 MCG tablet Take 1,000 mcg by mouth       vitamin E (TOCOPHEROL) 1000 units (450 mg) CAPS capsule Take 1 capsule (1,000 Units) by mouth 2 times daily (Patient not taking: Reported on 2024) 180 capsule 3         PHYSICAL EXAM     Vital signs  /64 (BP Location: Right arm, Patient Position: Sitting)   Pulse 61   Ht 1.778 m (5' 10\")   Wt 73.5 kg (162 lb)   BMI 23.24 kg/m      Weight:   162 lbs 0 oz  [place] Cognitive Assessment:    [place] Cognitive Assessment (MOCA)  Visuospatial/Executive : 3  Namin  Attention - Digits: 2  Attention - Letters: 1  Attention - Subtraction: 0  Language - Repeat: 1  Language - Fluency : 0  Abstraction: 1  Delayed Recall: 0  Orientation: 5  Education: 0  MOCA Score: 15  Administered by: : Aura Felipe CMA     [place] Cognitive Assessment Score:  MOCA Score: 15/30.    GENERAL PHYSICAL EXAM: Patient is alert and oriented x 4 in no acute distress. Neck was supple, no carotid bruits, thyromegaly, lymphadenopathy or JVD noted.   NEUROLOGICAL EXAM:  Patient is alert and oriented times 3 he can tell me his name date and place.  He scored 13/30 on MoCA today compared to 15/30 last year.  He can tell me the name of the president but he struggles with serial 7.  Pupil equal round and reactive face symmetrical tongue midline.  He has right pronator drift.  Strength on the right 5/5 on the left 5/5.  Reflexes 2+ on the right 1+ " on the left he has some dysmetria on finger-nose testing he has a wide-based gait and somewhat unstable with decreased arm swing     PERTINENT DIAGNOSTIC STUDIES     Following studies were reviewed:     CT BRAIN 6/13/2020  1.  4.9 x 5.4 x 5.7 cm intraparenchymal hemorrhage centered in the left parietal lobe and extending to the left periatrial region.  There is a small surrounding rim of vasogenic type edema. Consider contrast enhanced brain MRI for further evaluation if   the patient is clinically able to get MRI.  2.  It does have local mass effect with partial effacement of the atrium of the left lateral ventricle. 2.4 mm midline shift to the right at the level of the lateral ventricles.   3.  3.4 mm subdural hematoma along the posterior left frontal convexity.     MRI, MRA brain 2/1/2021  MRI BRAIN:  1. No finding for acute infarct, acute hemorrhage, or mass.     2. Area of chronic encephalomalacic change, hemosiderin staining, and gliosis is seen along the left frontoparietal junction corresponding to the site of prior parenchymal hemorrhage.  2. Moderate to marked dilatation of the lateral ventricles, stable compared to prior.     MRA [place] [facility]:  1. No vascular cutoff from aneurysm, or flow-limiting stenosis.     MRI BRAIN 12/1/2020  1.  Chronic parenchymal hematoma within the left temporoparietal lobes with encephalomalacia and volume loss. Irregular central T2 hypointensity and enhancement likely relates to scarring, no definite underlying mass. Continued follow-up recommended to  establish longer-term stability. Consider vascular imaging if not previously performed to more definitively exclude vascular etiology of hemorrhage.     MRI BRAIN 8/31/2020  1.  Previously described intraparenchymal hematoma in the left parietal lobe with extension to the left temporoparietal region has markedly decreased in size since previous, now measuring 3.0 x 1.2 cm in AP x transverse dimensions. This primarily    involves the lateral left parietal lobe with slight extension to the left frontoparietal region and is accompanied by hemosiderin staining extending into the left temporoparietal region. While there is a small amount of FLAIR hyperintense diffusion   restriction compatible with acute to very early subacute infarct along the anterior and medial margin of the above-described residual intraparenchymal hemorrhage in the left parietal lobe, this presumably relates to an evolving parenchymal injury from   the above-described intraparenchymal hematoma and is accompanied by a background of adjacent FLAIR hyperintense presumed gliosis. Without contrast, evaluation for an abnormal underlying mass is limited. Follow-up contrast-enhanced brain MRI after   resolution of the above-described residual intraparenchymal hemorrhage will be of benefit to exclude an underlying mass.     2.  Previously described subarachnoid hemorrhage and intraventricular extension of hemorrhage have resolved in the interim.     3.  Hemosiderin staining in the left parietal lobe and left temporoparietal region is accompanied by a small amount of hemosiderin staining along the atrium and occipital horn of the left lateral ventricle.     4.  Unchanged age-related changes, as above.     MRI BRAIN 6/13/2020  1.  Large left parietal parenchymal hemorrhage again noted. It is grossly unchanged in size. There is new intraventricular extension layering in the occipital horns and left atrium lateral ventricle compared to the prior study. Temporal horns are   slightly more prominent on the prior examination. Close interval follow-up suggested. Overall mass effect and midline shift is similar to the prior examination.     2.  There is multifocal stippled and somewhat nodular enhancement around the periphery of the parietal hemorrhage as detailed above. No definite focal masslike enhancement is identified. This may represent vascular structures and can  occasionally be seen   in active bleeding.     3.  Stable thin left convexity subdural hematoma.     4.  Small volume subarachnoid hemorrhage over the right frontal operculum and temporal convexity.     5.  Overall, the constellation of findings is nonspecific. Consider amyloid angiopathy. Given the stable abnormal enhancement, a component of cerebral amyloid angiopathy related inflammation is not excluded though considered less likely.     EEG 4/19/2021  Diffusely slow more so on the left side.  No sharp activity arrhythmic discharges seen to suggest seizures     EEG  6/14/2020  This is a normal EEG during wakefulness and drowsiness except due to mild background dysrhythmia and the EEG being limited due to motion artifact.  EEG is not suggestive of epilepsy.  Further clinical correlation is needed.     NEUROPSYCHOLOGICAL EVALUATION 4/1/2021  Major Vascular Neurocognitive Disorder     Adjustment Disorder with Mixed Anxiety and Depressed Mood     RECOMMENDATIONS:  1) Continued neurologic follow up is recommended for ongoing monitoring and treatment. Consider a trial of a cholinesterase inhibitor (e.g., donepezil) if not medically contraindicated, as this medication has been shown to benefit individuals with vascular cognitive impairment.     2) Ongoing participation in speech therapy (cognitive rehabilitation) is strongly encouraged. The patient appears to be benefiting from this in many ways.     3) Additional emotional support is also strongly recommended. I have provided Mr. Dueñas and his wife with various support groups in the community (for survivors and/or loved ones). I have also discussed with him a referral to see our psychologist for individual psychotherapy to help him process, cope, and even come to accept some of the losses he has endured as a result of his CVAs. Mr. Dueñas is not interested in either of these suggestions at this time, but asked that I mail him these resources in case he changes his mind.  He does get some additional support from his [employer].     4) Consider an adjustment to his antidepressant medication regimen (either increasing the dosage or trying an alternative) to help better manage his emotional symptoms.     5) Mr. Dueñas is encouraged to adhere closely to his medication regimen in general, in order to keep his cerebrovascular risk factors (e.g., high blood pressure and high cholesterol) well managed. This may help to prevent future CVAs and additional cognitive decline.     6) If not already done, completion of paperwork for advance directives and assignment of healthcare and financial Power of  should be considered at this time.     7) Mr. Dueñas is in a living situation in which most of his complex daily activities (such as driving and financial management) are arranged and managed for him. This all remains appropriate. Thus, he is quite fortunate in that, despite the evidence of cognitive impairment, his current independent living situation appears to be working well for him.     8) Mr. Dueñas is encouraged to remain physically, socially, and mentally active in order to optimize his brain health.     9) Neuropsychological follow-up is recommended in approximately 12 months (or as clinically indicated) in order to monitor his cognitive status and update recommendations. The current test data can be used as a baseline to which future comparisons can be made     PERTINENT LABS  Following labs were reviewed:  No visits with results within 3 Month(s) from this visit.   Latest known visit with results is:   Lab on 07/05/2023   Component Date Value Ref Range Status    Protein Total 07/05/2023 6.3 (L)  6.4 - 8.3 g/dL Final    Albumin 07/05/2023 4.2  3.5 - 5.2 g/dL Final    Bilirubin Total 07/05/2023 0.6  <=1.2 mg/dL Final    Alkaline Phosphatase 07/05/2023 66  40 - 129 U/L Final    AST 07/05/2023 31  0 - 45 U/L Final    ALT 07/05/2023 29  0 - 70 U/L Final    Bilirubin Direct 07/05/2023 <0.20   0.00 - 0.30 mg/dL Final         Total time spent for face to face visit, reviewing labs/imaging studies, counseling and coordination of care was: 30 Minutes spent on the date of the encounter doing chart review, review of outside records, review of test results, interpretation of tests, patient visit, and documentation     The longitudinal plan of care for the diagnosis(es)/condition(s) as documented were addressed during this visit. Due to the added complexity in care, I will continue to support John in the subsequent management and with ongoing continuity of care.    This note was dictated using voice recognition software.  Any grammatical or context distortions are unintentional and inherent to the software.    No orders of the defined types were placed in this encounter.     New Prescriptions    No medications on file     Modified Medications    No medications on file

## 2024-07-11 ENCOUNTER — OFFICE VISIT (OUTPATIENT)
Dept: NEUROLOGY | Facility: CLINIC | Age: 79
End: 2024-07-11
Payer: COMMERCIAL

## 2024-07-11 ENCOUNTER — LAB (OUTPATIENT)
Dept: LAB | Facility: HOSPITAL | Age: 79
End: 2024-07-11
Payer: COMMERCIAL

## 2024-07-11 VITALS
HEART RATE: 61 BPM | SYSTOLIC BLOOD PRESSURE: 119 MMHG | BODY MASS INDEX: 23.19 KG/M2 | DIASTOLIC BLOOD PRESSURE: 64 MMHG | HEIGHT: 70 IN | WEIGHT: 162 LBS

## 2024-07-11 DIAGNOSIS — F01.50 VASCULAR DEMENTIA WITHOUT BEHAVIORAL DISTURBANCE (H): Primary | ICD-10-CM

## 2024-07-11 DIAGNOSIS — F01.50 VASCULAR DEMENTIA WITHOUT BEHAVIORAL DISTURBANCE (H): ICD-10-CM

## 2024-07-11 LAB
ALBUMIN SERPL BCG-MCNC: 4.1 G/DL (ref 3.5–5.2)
ALP SERPL-CCNC: 81 U/L (ref 40–150)
ALT SERPL W P-5'-P-CCNC: 37 U/L (ref 0–70)
AST SERPL W P-5'-P-CCNC: 30 U/L (ref 0–45)
BILIRUB DIRECT SERPL-MCNC: <0.2 MG/DL (ref 0–0.3)
BILIRUB SERPL-MCNC: 0.6 MG/DL
PROT SERPL-MCNC: 6.3 G/DL (ref 6.4–8.3)

## 2024-07-11 PROCEDURE — 80076 HEPATIC FUNCTION PANEL: CPT

## 2024-07-11 PROCEDURE — 99214 OFFICE O/P EST MOD 30 MIN: CPT | Performed by: PSYCHIATRY & NEUROLOGY

## 2024-07-11 PROCEDURE — G2211 COMPLEX E/M VISIT ADD ON: HCPCS | Performed by: PSYCHIATRY & NEUROLOGY

## 2024-07-11 PROCEDURE — 36415 COLL VENOUS BLD VENIPUNCTURE: CPT

## 2024-07-11 RX ORDER — ASPIRIN 81 MG/1
81 TABLET ORAL DAILY
COMMUNITY
Start: 2023-09-26

## 2024-07-11 RX ORDER — ATORVASTATIN CALCIUM 20 MG/1
1 TABLET, FILM COATED ORAL AT BEDTIME
COMMUNITY
Start: 2024-06-13

## 2024-07-11 RX ORDER — MULTIVIT WITH MINERALS/LUTEIN
1000 TABLET ORAL 2 TIMES DAILY
Qty: 180 CAPSULE | Refills: 3 | Status: SHIPPED | OUTPATIENT
Start: 2024-07-11

## 2024-07-11 RX ORDER — MEMANTINE HYDROCHLORIDE 10 MG/1
10 TABLET ORAL 2 TIMES DAILY
Qty: 180 TABLET | Refills: 3 | Status: SHIPPED | OUTPATIENT
Start: 2024-07-11

## 2024-07-11 RX ORDER — DONEPEZIL HYDROCHLORIDE 10 MG/1
10 TABLET, FILM COATED ORAL AT BEDTIME
Qty: 90 TABLET | Refills: 3 | Status: SHIPPED | OUTPATIENT
Start: 2024-07-11

## 2024-07-11 ASSESSMENT — MONTREAL COGNITIVE ASSESSMENT (MOCA)
6. READ LIST OF DIGITS [FORWARD/BACKWARD]: 2
11. FOR EACH PAIR OF WORDS, WHAT CATEGORY DO THEY BELONG TO (OUT OF 2): 1
WHAT IS THE TOTAL SCORE (OUT OF 30): 15
12. MEMORY INDEX SCORE: 0
13. ORIENTATION SUBSCORE: 5
7. [VIGILENCE] TAP WHEN HEARING DESIGNATED LETTER: 1
10. [FLUENCY] NAME WORDS STARTING WITH DESIGNATED LETTER: 0
WHAT LEVEL OF EDUCATION WAS ATTAINED: 0
9. REPEAT EACH SENTENCE: 1
8. SERIAL SUBTRACTION OF 7S: 0
VISUOSPATIAL/EXECUTIVE SUBSCORE: 3
4. NAME EACH OF THE THREE ANIMALS SHOWN: 2

## 2024-07-11 NOTE — LETTER
2024      John Dueñas  [address]  [place] [address]      Dear Colleague,    Thank you for referring your patient, John Dueñas, to the [facility] [place]. Please see a copy of my visit note below.    NEUROLOGY FOLLOW UP VISIT  NOTE       [facility] [place]  [address] [place] [address]  Tel: [phone]  Fax: [phone]  [email or web address]     John Dueñas,  1945, MRN [id number]  PCP: aMlcolm Sun  Date: 2024      ASSESSMENT & PLAN     Visit Diagnosis  Vascular dementia without behavioral disturbance (H)     Vascular dementia without behavioral disturbance  79-year-old male with history of intracranial hemorrhage, HTN, HLD with vascular dementia.  He is currently on Aricept and Namenda and his MoCA score has remained stable (15/30 today).  During his last visit I had added vitamin E but for unclear reason he never got it filled.  I have recommended:    1.  Continue Aricept 10 mg daily and Namenda 10 mg twice daily.  Prescriptions were filled  2.  Vitamin E twice 1000 unit twice daily  3.  Hepatic panel  4.  Patient again wanted to know why he cannot drive and was told due to dementia he is at increased risk of accident and he should not drive  5.  Follow-up in 1 year    Thank you again for this referral, please feel free to contact me if you have any questions.    Tom Rivera MD  [facility] NEUROLOG[facility], [place]     HISTORY OF PRESENT ILLNESS     Patient is a 79-year-old male with history of intracranial hemorrhage, HTN, HLD last seen on 2023 for vascular dementia who returns for follow-up.  His last MoCA score was 13/30 and was told to continue Aricept and Namenda and vitamin E were added.  Additionally no change was made in the dose of Celexa.  He was told to stop driving.  He also struggles with depression and although felt his memory was stable wife reported that there was a steady  Will prescribe Fioricet for acute breakthrough headaches as well as a steroid taper pack  Patient advised to contact our offices for worsening of symptoms  decline.  He denies any new symptoms and although during his last visit I had prescribed a vitamin E for unclear reason he never got that filled.  His major complaint today is not being able to drive     PROBLEM LIST   Patient Active Problem List   Diagnosis     Hypercholesteremia     Lumbar disc herniation with radiculopathy     Myopia     Impaired fasting glucose     Malignant essential hypertension     Advance care planning     Depression with anxiety     Mixed type age-related cataract, both eyes     Vascular dementia (H)     Coronary artery disease due to lipid rich plaque         PAST MEDICAL & SURGICAL HISTORY     Past Medical History:   Patient  has a past medical history of Herniated disc, Intracerebral hemorrhage, nontraumatic (H) (6/13/2020), Intracranial hemorrhage (H) (8/6/2020), SAH (subarachnoid hemorrhage) (H) (6/14/2020), and Subdural hematoma (H) (6/24/2020).    Surgical History:  He  has a past surgical history that includes tonsillectomy and Tonsillectomy (1950).     SOCIAL HISTORY     Reviewed, and he  reports that he has never smoked. He has never used smokeless tobacco. He reports that he does not currently use alcohol.     FAMILY HISTORY     Reviewed, and family history includes Chronic Obstructive Pulmonary Disease in his father; Diabetes in his maternal grandfather; Heart Disease in his brother; Multiple myeloma in his mother.     ALLERGIES     Allergies   Allergen Reactions     Ether Other (See Comments)     Lactose Other (See Comments)         REVIEW OF SYSTEMS     A 12 point review of system was performed and was negative except as outlined in the history of present illness.     HOME MEDICATIONS     Current Outpatient Rx   Medication Sig Dispense Refill     acetaminophen (TYLENOL) 500 MG tablet Take 1 tablet by mouth       aspirin 81 MG EC tablet Take 81 mg by mouth daily       atorvastatin (LIPITOR) 20 MG tablet Take 1 tablet by mouth at bedtime       calcium citrate-vitamin D  "(CITRACAL) 315-250 MG-UNIT TABS per tablet Take 2 tablets by mouth       cholecalciferol (VITAMIN D-1000 MAX ST) 25 MCG (1000 UT) TABS Take 1,000 Units by mouth       donepezil (ARICEPT) 10 MG tablet TAKE 1 TABLET BY MOUTH EVERYDAY AT BEDTIME 90 tablet 0     escitalopram (LEXAPRO) 10 MG tablet Take 10 mg by mouth At Bedtime        Glucosamine-Chondroit-Vit C-Mn (GLUCOSAMINE CHONDROITINOITIN COMPLEX) CAPS        magnesium oxide (MAG-OX) 400 MG tablet Take 1 tablet by mouth daily at 2 pm       melatonin 5 MG tablet        memantine (NAMENDA) 10 MG tablet TAKE 1 TABLET BY MOUTH TWICE A  tablet 0     multivitamin w/minerals (THERA-VIT-M) tablet Take 1 tablet by mouth       tamsulosin (FLOMAX) 0.4 MG capsule Take 0.4 mg by mouth       triamcinolone (KENALOG) 0.025 % cream Apply topically 2 times daily       vitamin B-12 (CYANOCOBALAMIN) 1000 MCG tablet Take 1,000 mcg by mouth        vitamin E (TOCOPHEROL) 1000 units (450 mg) CAPS capsule Take 1 capsule (1,000 Units) by mouth 2 times daily (Patient not taking: Reported on 2024) 180 capsule 3         PHYSICAL EXAM     Vital signs  /64 (BP Location: Right arm, Patient Position: Sitting)   Pulse 61   Ht 1.778 m (5' 10\")   Wt 73.5 kg (162 lb)   BMI 23.24 kg/m      Weight:   162 lbs 0 oz  Chavo Cognitive Assessment:    [place] Cognitive Assessment (MOCA)  Visuospatial/Executive : 3  Namin  Attention - Digits: 2  Attention - Letters: 1  Attention - Subtraction: 0  Language - Repeat: 1  Language - Fluency : 0  Abstraction: 1  Delayed Recall: 0  Orientation: 5  Education: 0  MOCA Score: 15  Administered by: : Aura Felipe CMA     Chavo Cognitive Assessment Score:  MOCA Score: 15/30.    GENERAL PHYSICAL EXAM: Patient is alert and oriented x 4 in no acute distress. Neck was supple, no carotid bruits, thyromegaly, lymphadenopathy or JVD noted.   NEUROLOGICAL EXAM:  Patient is alert and oriented times 3 he can tell me his name date and place.  He " scored 13/30 on MoCA today compared to 15/30 last year.  He can tell me the name of the president but he struggles with serial 7.  Pupil equal round and reactive face symmetrical tongue midline.  He has right pronator drift.  Strength on the right 5/5 on the left 5/5.  Reflexes 2+ on the right 1+ on the left he has some dysmetria on finger-nose testing he has a wide-based gait and somewhat unstable with decreased arm swing     PERTINENT DIAGNOSTIC STUDIES     Following studies were reviewed:     CT BRAIN 6/13/2020  1.  4.9 x 5.4 x 5.7 cm intraparenchymal hemorrhage centered in the left parietal lobe and extending to the left periatrial region.  There is a small surrounding rim of vasogenic type edema. Consider contrast enhanced brain MRI for further evaluation if   the patient is clinically able to get MRI.  2.  It does have local mass effect with partial effacement of the atrium of the left lateral ventricle. 2.4 mm midline shift to the right at the level of the lateral ventricles.   3.  3.4 mm subdural hematoma along the posterior left frontal convexity.     MRI, MRA brain 2/1/2021  MRI BRAIN:  1. No finding for acute infarct, acute hemorrhage, or mass.     2. Area of chronic encephalomalacic change, hemosiderin staining, and gliosis is seen along the left frontoparietal junction corresponding to the site of prior parenchymal hemorrhage.  2. Moderate to marked dilatation of the lateral ventricles, stable compared to prior.     MRA Solomon OF [facility]ELSON:  1. No vascular cutoff from aneurysm, or flow-limiting stenosis.     MRI BRAIN 12/1/2020  1.  Chronic parenchymal hematoma within the left temporoparietal lobes with encephalomalacia and volume loss. Irregular central T2 hypointensity and enhancement likely relates to scarring, no definite underlying mass. Continued follow-up recommended to  establish longer-term stability. Consider vascular imaging if not previously performed to more definitively exclude vascular  etiology of hemorrhage.     MRI BRAIN 8/31/2020  1.  Previously described intraparenchymal hematoma in the left parietal lobe with extension to the left temporoparietal region has markedly decreased in size since previous, now measuring 3.0 x 1.2 cm in AP x transverse dimensions. This primarily   involves the lateral left parietal lobe with slight extension to the left frontoparietal region and is accompanied by hemosiderin staining extending into the left temporoparietal region. While there is a small amount of FLAIR hyperintense diffusion   restriction compatible with acute to very early subacute infarct along the anterior and medial margin of the above-described residual intraparenchymal hemorrhage in the left parietal lobe, this presumably relates to an evolving parenchymal injury from   the above-described intraparenchymal hematoma and is accompanied by a background of adjacent FLAIR hyperintense presumed gliosis. Without contrast, evaluation for an abnormal underlying mass is limited. Follow-up contrast-enhanced brain MRI after   resolution of the above-described residual intraparenchymal hemorrhage will be of benefit to exclude an underlying mass.     2.  Previously described subarachnoid hemorrhage and intraventricular extension of hemorrhage have resolved in the interim.     3.  Hemosiderin staining in the left parietal lobe and left temporoparietal region is accompanied by a small amount of hemosiderin staining along the atrium and occipital horn of the left lateral ventricle.     4.  Unchanged age-related changes, as above.     MRI BRAIN 6/13/2020  1.  Large left parietal parenchymal hemorrhage again noted. It is grossly unchanged in size. There is new intraventricular extension layering in the occipital horns and left atrium lateral ventricle compared to the prior study. Temporal horns are   slightly more prominent on the prior examination. Close interval follow-up suggested. Overall mass effect and  midline shift is similar to the prior examination.     2.  There is multifocal stippled and somewhat nodular enhancement around the periphery of the parietal hemorrhage as detailed above. No definite focal masslike enhancement is identified. This may represent vascular structures and can occasionally be seen   in active bleeding.     3.  Stable thin left convexity subdural hematoma.     4.  Small volume subarachnoid hemorrhage over the right frontal operculum and temporal convexity.     5.  Overall, the constellation of findings is nonspecific. Consider amyloid angiopathy. Given the stable abnormal enhancement, a component of cerebral amyloid angiopathy related inflammation is not excluded though considered less likely.     EEG 4/19/2021  Diffusely slow more so on the left side.  No sharp activity arrhythmic discharges seen to suggest seizures     EEG  6/14/2020  This is a normal EEG during wakefulness and drowsiness except due to mild background dysrhythmia and the EEG being limited due to motion artifact.  EEG is not suggestive of epilepsy.  Further clinical correlation is needed.     NEUROPSYCHOLOGICAL EVALUATION 4/1/2021  Major Vascular Neurocognitive Disorder     Adjustment Disorder with Mixed Anxiety and Depressed Mood     RECOMMENDATIONS:  1) Continued neurologic follow up is recommended for ongoing monitoring and treatment. Consider a trial of a cholinesterase inhibitor (e.g., donepezil) if not medically contraindicated, as this medication has been shown to benefit individuals with vascular cognitive impairment.     2) Ongoing participation in speech therapy (cognitive rehabilitation) is strongly encouraged. The patient appears to be benefiting from this in many ways.     3) Additional emotional support is also strongly recommended. I have provided Mr. Dueñas and his wife with various support groups in the community (for survivors and/or loved ones). I have also discussed with him a referral to see our  psychologist for individual psychotherapy to help him process, cope, and even come to accept some of the losses he has endured as a result of his CVAs. Mr. Dueñas is not interested in either of these suggestions at this time, but asked that I mail him these resources in case he changes his mind. He does get some additional support from his [employer].     4) Consider an adjustment to his antidepressant medication regimen (either increasing the dosage or trying an alternative) to help better manage his emotional symptoms.     5) Mr. Dueñas is encouraged to adhere closely to his medication regimen in general, in order to keep his cerebrovascular risk factors (e.g., high blood pressure and high cholesterol) well managed. This may help to prevent future CVAs and additional cognitive decline.     6) If not already done, completion of paperwork for advance directives and assignment of healthcare and financial Power of  should be considered at this time.     7) Mr. Dueñas is in a living situation in which most of his complex daily activities (such as driving and financial management) are arranged and managed for him. This all remains appropriate. Thus, he is quite fortunate in that, despite the evidence of cognitive impairment, his current independent living situation appears to be working well for him.     8) Mr. Dueñas is encouraged to remain physically, socially, and mentally active in order to optimize his brain health.     9) Neuropsychological follow-up is recommended in approximately 12 months (or as clinically indicated) in order to monitor his cognitive status and update recommendations. The current test data can be used as a baseline to which future comparisons can be made     PERTINENT LABS  Following labs were reviewed:  No visits with results within 3 Month(s) from this visit.   Latest known visit with results is:   Lab on 07/05/2023   Component Date Value Ref Range Status     Protein Total 07/05/2023 6.3 (L)   6.4 - 8.3 g/dL Final     Albumin 07/05/2023 4.2  3.5 - 5.2 g/dL Final     Bilirubin Total 07/05/2023 0.6  <=1.2 mg/dL Final     Alkaline Phosphatase 07/05/2023 66  40 - 129 U/L Final     AST 07/05/2023 31  0 - 45 U/L Final     ALT 07/05/2023 29  0 - 70 U/L Final     Bilirubin Direct 07/05/2023 <0.20  0.00 - 0.30 mg/dL Final         Total time spent for face to face visit, reviewing labs/imaging studies, counseling and coordination of care was: 30 Minutes spent on the date of the encounter doing chart review, review of outside records, review of test results, interpretation of tests, patient visit, and documentation     The longitudinal plan of care for the diagnosis(es)/condition(s) as documented were addressed during this visit. Due to the added complexity in care, I will continue to support John in the subsequent management and with ongoing continuity of care.    This note was dictated using voice recognition software.  Any grammatical or context distortions are unintentional and inherent to the software.    No orders of the defined types were placed in this encounter.     New Prescriptions    No medications on file     Modified Medications    No medications on file                 Again, thank you for allowing me to participate in the care of your patient.        Sincerely,        Tom Rivera MD

## 2024-07-11 NOTE — NURSING NOTE
YUKO COGNITIVE ASSESSMENT (MOCA)  Version 7.1 Original Version  VISUOSPATIAL/EXECUTIVE               COPY CUBE      [ 1   ]                                [   1 ] DRAW CLOCK (Ten past eleven)  (3 points)    [   1 ]                    [  0  ]               [  0  ]       Contour            Numbers     Hands POINTS               3    / 5   NAMING    [ 1  ]                                                                        [   0 ]                                             [ 1   ]  Livictor manuel Smiley                                Camel                 2    / 3   MEMORY Read list of words, subject must repeat them. Do 2 trials, even if 1st trial is successful. Do a recall after 5 minutes  FACE VELVET Yarsani OSMANI RED No Points    1st x x x x x     2nd x  x x x    ATTENTION Read list of digits (1 digit/sec) Subject has to repeat in the forward order       [  1  ]   2  1  8  5  4                                [  1  ] 7 4 2                       2   /2   Read list of letters. The subject must tap with his hand at each letter A. No points if > 2 errors.  [ 1   ] F B A C M N A A J K L B A F A K D E A A A J A M O F A A B         1     /1   Serial 7 subtraction starting at 100          [    ] 93         [    ] 86          [    ] 79          [    ] 72         [    ] 65   4 or 5 correct subtractions: 3 points,  2 or 3 correct: 2 points,  1correct: 1 point,   0 correct: 0 points       0     /3   LANGUAGE Repeat: I only know that Too is the one to help today. [ 1    ]                                      The cat always hid under the couch when dogs were in the room. [ 0  ]         1      /2   Fluency: Name maximum number of words in one minute that begin with the letter F                                                                                                                    [  0  ] _6__ (N > 11 words)          0     /1   ABSTRACTION  Similarity between e.g. banana-orange=fruit                                                                   [   1 ] train-bicycle                      [  0  ] watch-ruler            1  /2   DELAYED  RECALL Has to recall words  WITH NO CUE FACE  [    ] VELVET  [    ] Restorationism  [    ]  OSMANI  [    ] RED  [    ] Points for UNCUED recall only        0    /5           OPTIONAL Category cue           Multiple choice cue          ORIENTATION  [  1  ] Date     [  1  ] Month       [  0  ] Year      [   1 ] Day      [ 1   ] Place        [  1  ] City       5 /6   TOTAL  Normal > 26/30 Add 1 point if < 12 years education   15     /30

## 2024-07-11 NOTE — NURSING NOTE
Chief Complaint   Patient presents with    Vascular dementia without behavioral disturbance     Annual follow up- MOCA 15/30. Not taking vitamin E for unknown reason      Aura Felipe CMA on 7/11/2024 at 10:47 AM  [facility]

## 2025-07-04 NOTE — PROGRESS NOTES
NEUROLOGY FOLLOW UP VISIT  NOTE       [facility] NEUROLOGY [place]  [address] [place] [address]  Tel: [phone]  Fax: [phone]  [email or web address]     John Dueñas,  1945, MRN [id number]  PCP: Malcolm Sun  Date: 07/10/2025      ASSESSMENT & PLAN     Visit Diagnosis  Vascular dementia without behavioral disturbance (H)     Vascular dementia without behavioral disturbance  80-year-old male with history of HTN, HLD, ICH, vascular dementia who returns for follow-up.  His symptoms are stable on combination of Aricept, Namenda and vitamin E.  His MoCA score is 17/30 today compared to 15/30 last year.  He again inquired if he can drive and I told him due to his dementia he should not be driving.  I have recommended:    1.  Continue Aricept 10 mg daily, Namenda 10 mg twice daily and vitamin  E 1000 unit twice daily.  Prescriptions were filled  2.  Check hepatic panel  3.  Follow-up in 1 year    Thank you again for this referral, please feel free to contact me if you have any questions.    Tom Rivera MD  [facility] NEUROLOGY, [place]     HISTORY OF PRESENT ILLNESS     Patient is a 80-year-old gentleman with history of ICH, HTN, HLD, vascular dementia who returns for his yearly follow-up.  During his last visit he was continued on Aricept, Namenda and vitamin E.  He was told he should not drive.  Since his last visit he denies any new complaints.  Wife feels his memory is stable.  There is no behavioral issues.  He denies any bladder control issues or any gait difficulty.  He again inquired if he can drive and was told due to his dementia he should not be driving    Briefly patient is a male with a history of HTN, HLD who was admitted to [facility] on 2020 with acute confusion and speech difficulty.  CT of the head showed  a large left parietal temporal intracerebral hemorrhage.  There was extension into the ventricle and repeat CT scan was done with  gradual improvement.  MRI scan showed left temporal hemorrhage and question of amyloid angiopathy was raised.  Although with the rehab there was improvement in his strength he had significant weakness and initially was continued on Keppra that was tapered later on.  He was diagnosed with vascular dementia and started on Aricept, Namenda and vitamin E.     PROBLEM LIST   Patient Active Problem List   Diagnosis    Hypercholesteremia    Lumbar disc herniation with radiculopathy    Myopia    Impaired fasting glucose    Malignant essential hypertension    Advance care planning    Depression with anxiety    Mixed type age-related cataract, both eyes    Vascular dementia (H)    Coronary artery disease due to lipid rich plaque         PAST MEDICAL & SURGICAL HISTORY     Past Medical History:   Patient  has a past medical history of Herniated disc, Intracerebral hemorrhage, nontraumatic (H) (6/13/2020), Intracranial hemorrhage (H) (8/6/2020), SAH (subarachnoid hemorrhage) (H) (6/14/2020), and Subdural hematoma (H) (6/24/2020).    Surgical History:  He  has a past surgical history that includes tonsillectomy and Tonsillectomy (1950).     SOCIAL HISTORY     Reviewed, and he  reports that he has never smoked. He has never used smokeless tobacco. He reports that he does not currently use alcohol.     FAMILY HISTORY     Reviewed, and family history includes Chronic Obstructive Pulmonary Disease in his father; Diabetes in his maternal grandfather; Heart Disease in his brother; Multiple myeloma in his mother.     ALLERGIES     Allergies   Allergen Reactions    Ether Other (See Comments)    Lactose Other (See Comments)         REVIEW OF SYSTEMS     A 12 point review of system was performed and was negative except as outlined in the history of present illness.     HOME MEDICATIONS     Current Outpatient Rx   Medication Sig Dispense Refill    acetaminophen (TYLENOL) 500 MG tablet Take 1 tablet by mouth      aspirin 81 MG EC tablet  Take 81 mg by mouth daily      atorvastatin (LIPITOR) 20 MG tablet Take 1 tablet by mouth at bedtime      calcium citrate-vitamin D (CITRACAL) 315-250 MG-UNIT TABS per tablet Take 2 tablets by mouth      cholecalciferol (VITAMIN D-1000 MAX ST) 25 MCG (1000 UT) TABS Take 1,000 Units by mouth      donepezil (ARICEPT) 10 MG tablet Take 1 tablet (10 mg) by mouth at bedtime 90 tablet 3    escitalopram (LEXAPRO) 10 MG tablet Take 10 mg by mouth At Bedtime       Glucosamine-Chondroit-Vit C-Mn (GLUCOSAMINE CHONDROITINOITIN COMPLEX) CAPS       magnesium oxide (MAG-OX) 400 MG tablet Take 1 tablet by mouth daily at 2 pm      melatonin 5 MG tablet       memantine (NAMENDA) 10 MG tablet Take 1 tablet (10 mg) by mouth 2 times daily 180 tablet 3    metoprolol succinate ER (TOPROL XL) 25 MG 24 hr tablet Take 12.5 mg by mouth daily.      multivitamin w/minerals (THERA-VIT-M) tablet Take 1 tablet by mouth      tamsulosin (FLOMAX) 0.4 MG capsule Take 0.4 mg by mouth      triamcinolone (KENALOG) 0.025 % cream Apply topically 2 times daily      vitamin B-12 (CYANOCOBALAMIN) 1000 MCG tablet Take 1,000 mcg by mouth       vitamin E (TOCOPHEROL) 1000 units (450 mg) CAPS capsule Take 1 capsule (1,000 Units) by mouth 2 times daily 180 capsule 3         PHYSICAL EXAM     Vital signs  /70   Pulse 56   Resp 18   Wt 71.2 kg (157 lb)   BMI 22.53 kg/m      Weight:   157 lbs 0 oz      7/5/2023     9:19 AM 7/11/2024    10:35 AM 7/10/2025    10:35 AM   MOCA   Visuospatial/Executive  3 3 4   Naming 2 2 2   Attention - Digits 1 2 1   Attention - Letters 0 1 0   Attention - Subtraction 0 0 2   Language - Repeat 0 1 1   Language - Fluency  0 0 0   Abstraction 2 1 2   Delayed Recall 2 0 0   Orientation 3 5 5   Education 0 0 0   MOCA Score 13 15 17   Administered by:  Aura Felipe, REGINA Felipe, REGINA Delgado, REGINA       GENERAL PHYSICAL EXAM: Patient is alert and oriented x 4 in no acute distress. Neck was supple, no carotid bruits,  thyromegaly, lymphadenopathy or JVD noted.   NEUROLOGICAL EXAM:  Patient is alert and oriented times 3 he can tell me his name date and place.  He scored 13/30 on MoCA today compared to 15/30 last year.  He can tell me the name of the president but he struggles with serial 7.  Pupil equal round and reactive face symmetrical tongue midline.  He has right pronator drift.  Strength on the right 5/5 on the left 5/5.  Reflexes 2+ on the right 1+ on the left he has some dysmetria on finger-nose testing he has a wide-based gait and somewhat unstable with decreased arm swing     PERTINENT DIAGNOSTIC STUDIES     Following studies were reviewed:     CT BRAIN 6/13/2020  1.  4.9 x 5.4 x 5.7 cm intraparenchymal hemorrhage centered in the left parietal lobe and extending to the left periatrial region.  There is a small surrounding rim of vasogenic type edema. Consider contrast enhanced brain MRI for further evaluation if   the patient is clinically able to get MRI.  2.  It does have local mass effect with partial effacement of the atrium of the left lateral ventricle. 2.4 mm midline shift to the right at the level of the lateral ventricles.   3.  3.4 mm subdural hematoma along the posterior left frontal convexity.     MRI LUMBAR SPINE 5/31/2023  1.  Lumbar spondylosis most pronounced at L3-L4 through L5-S1.     2.  Slight progression of degenerative changes at the L3-L4 and L4-L5 level since the 2015 exam.     3.  Decreased size of right foraminal disc protrusion at L1-L2.    MRI, MRA brain 2/1/2021  MRI BRAIN:  1. No finding for acute infarct, acute hemorrhage, or mass.     2. Area of chronic encephalomalacic change, hemosiderin staining, and gliosis is seen along the left frontoparietal junction corresponding to the site of prior parenchymal hemorrhage.  2. Moderate to marked dilatation of the lateral ventricles, stable compared to prior.     MRA [place] [facility] [place]:  1. No vascular cutoff from aneurysm, or flow-limiting  stenosis.     MRI BRAIN 12/1/2020  1.  Chronic parenchymal hematoma within the left temporoparietal lobes with encephalomalacia and volume loss. Irregular central T2 hypointensity and enhancement likely relates to scarring, no definite underlying mass. Continued follow-up recommended to  establish longer-term stability. Consider vascular imaging if not previously performed to more definitively exclude vascular etiology of hemorrhage.     MRI BRAIN 8/31/2020  1.  Previously described intraparenchymal hematoma in the left parietal lobe with extension to the left temporoparietal region has markedly decreased in size since previous, now measuring 3.0 x 1.2 cm in AP x transverse dimensions. This primarily   involves the lateral left parietal lobe with slight extension to the left frontoparietal region and is accompanied by hemosiderin staining extending into the left temporoparietal region. While there is a small amount of FLAIR hyperintense diffusion   restriction compatible with acute to very early subacute infarct along the anterior and medial margin of the above-described residual intraparenchymal hemorrhage in the left parietal lobe, this presumably relates to an evolving parenchymal injury from   the above-described intraparenchymal hematoma and is accompanied by a background of adjacent FLAIR hyperintense presumed gliosis. Without contrast, evaluation for an abnormal underlying mass is limited. Follow-up contrast-enhanced brain MRI after   resolution of the above-described residual intraparenchymal hemorrhage will be of benefit to exclude an underlying mass.     2.  Previously described subarachnoid hemorrhage and intraventricular extension of hemorrhage have resolved in the interim.     3.  Hemosiderin staining in the left parietal lobe and left temporoparietal region is accompanied by a small amount of hemosiderin staining along the atrium and occipital horn of the left lateral ventricle.     4.  Unchanged  age-related changes, as above.     MRI BRAIN 6/13/2020  1.  Large left parietal parenchymal hemorrhage again noted. It is grossly unchanged in size. There is new intraventricular extension layering in the occipital horns and left atrium lateral ventricle compared to the prior study. Temporal horns are   slightly more prominent on the prior examination. Close interval follow-up suggested. Overall mass effect and midline shift is similar to the prior examination.     2.  There is multifocal stippled and somewhat nodular enhancement around the periphery of the parietal hemorrhage as detailed above. No definite focal masslike enhancement is identified. This may represent vascular structures and can occasionally be seen   in active bleeding.     3.  Stable thin left convexity subdural hematoma.     4.  Small volume subarachnoid hemorrhage over the right frontal operculum and temporal convexity.     5.  Overall, the constellation of findings is nonspecific. Consider amyloid angiopathy. Given the stable abnormal enhancement, a component of cerebral amyloid angiopathy related inflammation is not excluded though considered less likely.     EEG 4/19/2021  Diffusely slow more so on the left side.  No sharp activity arrhythmic discharges seen to suggest seizures     EEG  6/14/2020  This is a normal EEG during wakefulness and drowsiness except due to mild background dysrhythmia and the EEG being limited due to motion artifact.  EEG is not suggestive of epilepsy.  Further clinical correlation is needed.     NEUROPSYCHOLOGICAL EVALUATION 4/1/2021  Major Vascular Neurocognitive Disorder     Adjustment Disorder with Mixed Anxiety and Depressed Mood     RECOMMENDATIONS:  1) Continued neurologic follow up is recommended for ongoing monitoring and treatment. Consider a trial of a cholinesterase inhibitor (e.g., donepezil) if not medically contraindicated, as this medication has been shown to benefit individuals with vascular cognitive  impairment.     2) Ongoing participation in speech therapy (cognitive rehabilitation) is strongly encouraged. The patient appears to be benefiting from this in many ways.     3) Additional emotional support is also strongly recommended. I have provided Mr. Dueñas and his wife with various support groups in the community (for survivors and/or loved ones). I have also discussed with him a referral to see our psychologist for individual psychotherapy to help him process, cope, and even come to accept some of the losses he has endured as a result of his CVAs. Mr. Dueñas is not interested in either of these suggestions at this time, but asked that I mail him these resources in case he changes his mind. He does get some additional support from his Jain.     4) Consider an adjustment to his antidepressant medication regimen (either increasing the dosage or trying an alternative) to help better manage his emotional symptoms.     5) Mr. Dueñas is encouraged to adhere closely to his medication regimen in general, in order to keep his cerebrovascular risk factors (e.g., high blood pressure and high cholesterol) well managed. This may help to prevent future CVAs and additional cognitive decline.     6) If not already done, completion of paperwork for advance directives and assignment of healthcare and financial Power of  should be considered at this time.     7) Mr. Dueñas is in a living situation in which most of his complex daily activities (such as driving and financial management) are arranged and managed for him. This all remains appropriate. Thus, he is quite fortunate in that, despite the evidence of cognitive impairment, his current independent living situation appears to be working well for him.     8) Mr. Dueñas is encouraged to remain physically, socially, and mentally active in order to optimize his brain health.     9) Neuropsychological follow-up is recommended in approximately 12 months (or as clinically  indicated) in order to monitor his cognitive status and update recommendations. The current test data can be used as a baseline to which future comparisons can be made     PERTINENT LABS  Following labs were reviewed:  No visits with results within 3 Month(s) from this visit.   Latest known visit with results is:   Lab on 07/11/2024   Component Date Value Ref Range Status    Protein Total 07/11/2024 6.3 (L)  6.4 - 8.3 g/dL Final    Albumin 07/11/2024 4.1  3.5 - 5.2 g/dL Final    Bilirubin Total 07/11/2024 0.6  <=1.2 mg/dL Final    Alkaline Phosphatase 07/11/2024 81  40 - 150 U/L Final    AST 07/11/2024 30  0 - 45 U/L Final    ALT 07/11/2024 37  0 - 70 U/L Final    Bilirubin Direct 07/11/2024 <0.20  0.00 - 0.30 mg/dL Final         Total time spent for face to face visit, reviewing labs/imaging studies, counseling and coordination of care was: 30 Minutes spent on the date of the encounter doing chart review, review of outside records, review of test results, interpretation of tests, patient visit, documentation, and discussion with family     The longitudinal plan of care for the diagnosis(es)/condition(s) as documented were addressed during this visit. Due to the added complexity in care, I will continue to support John in the subsequent management and with ongoing continuity of care.    This note was dictated using voice recognition software.  Any grammatical or context distortions are unintentional and inherent to the software.    No orders of the defined types were placed in this encounter.     New Prescriptions    No medications on file     Modified Medications    No medications on file

## 2025-07-10 ENCOUNTER — LAB (OUTPATIENT)
Dept: LAB | Facility: HOSPITAL | Age: 80
End: 2025-07-10
Payer: COMMERCIAL

## 2025-07-10 ENCOUNTER — OFFICE VISIT (OUTPATIENT)
Dept: NEUROLOGY | Facility: CLINIC | Age: 80
End: 2025-07-10
Payer: COMMERCIAL

## 2025-07-10 ENCOUNTER — RESULTS FOLLOW-UP (OUTPATIENT)
Dept: NEUROLOGY | Facility: CLINIC | Age: 80
End: 2025-07-10

## 2025-07-10 VITALS
HEART RATE: 56 BPM | DIASTOLIC BLOOD PRESSURE: 70 MMHG | BODY MASS INDEX: 22.53 KG/M2 | RESPIRATION RATE: 18 BRPM | WEIGHT: 157 LBS | SYSTOLIC BLOOD PRESSURE: 126 MMHG

## 2025-07-10 DIAGNOSIS — F01.50 VASCULAR DEMENTIA WITHOUT BEHAVIORAL DISTURBANCE (H): Primary | ICD-10-CM

## 2025-07-10 DIAGNOSIS — F01.50 VASCULAR DEMENTIA WITHOUT BEHAVIORAL DISTURBANCE (H): ICD-10-CM

## 2025-07-10 LAB
ALBUMIN SERPL BCG-MCNC: 4.1 G/DL (ref 3.5–5.2)
ALP SERPL-CCNC: 73 U/L (ref 40–150)
ALT SERPL W P-5'-P-CCNC: 36 U/L (ref 0–70)
AST SERPL W P-5'-P-CCNC: 23 U/L (ref 0–45)
BILIRUB DIRECT SERPL-MCNC: 0.22 MG/DL (ref 0–0.3)
BILIRUB SERPL-MCNC: 0.6 MG/DL
PROT SERPL-MCNC: 6.3 G/DL (ref 6.4–8.3)

## 2025-07-10 PROCEDURE — 36415 COLL VENOUS BLD VENIPUNCTURE: CPT

## 2025-07-10 PROCEDURE — 82247 BILIRUBIN TOTAL: CPT

## 2025-07-10 RX ORDER — MEMANTINE HYDROCHLORIDE 10 MG/1
10 TABLET ORAL 2 TIMES DAILY
Qty: 180 TABLET | Refills: 3 | Status: SHIPPED | OUTPATIENT
Start: 2025-07-10

## 2025-07-10 RX ORDER — DONEPEZIL HYDROCHLORIDE 10 MG/1
10 TABLET, FILM COATED ORAL AT BEDTIME
Qty: 90 TABLET | Refills: 3 | Status: SHIPPED | OUTPATIENT
Start: 2025-07-10

## 2025-07-10 RX ORDER — METOPROLOL SUCCINATE 25 MG/1
12.5 TABLET, EXTENDED RELEASE ORAL DAILY
COMMUNITY
Start: 2025-06-09

## 2025-07-10 RX ORDER — MULTIVIT WITH MINERALS/LUTEIN
1000 TABLET ORAL 2 TIMES DAILY
Qty: 180 CAPSULE | Refills: 3 | Status: SHIPPED | OUTPATIENT
Start: 2025-07-10

## 2025-07-10 ASSESSMENT — MONTREAL COGNITIVE ASSESSMENT (MOCA)
9. REPEAT EACH SENTENCE: 1
WHAT IS THE TOTAL SCORE (OUT OF 30): 17
12. MEMORY INDEX SCORE: 0
WHAT LEVEL OF EDUCATION WAS ATTAINED: 0
8. SERIAL SUBTRACTION OF 7S: 2
VISUOSPATIAL/EXECUTIVE SUBSCORE: 4
13. ORIENTATION SUBSCORE: 5
7. [VIGILENCE] TAP WHEN HEARING DESIGNATED LETTER: 0
6. READ LIST OF DIGITS [FORWARD/BACKWARD]: 1
4. NAME EACH OF THE THREE ANIMALS SHOWN: 2
10. [FLUENCY] NAME WORDS STARTING WITH DESIGNATED LETTER: 0
11. FOR EACH PAIR OF WORDS, WHAT CATEGORY DO THEY BELONG TO (OUT OF 2): 2

## 2025-07-10 NOTE — LETTER
7/10/2025      John Dueñas  [address]  [place] [address]      Dear Colleague,    Thank you for referring your patient, John Dueñas, to the [facility] [place]. Please see a copy of my visit note below.    NEUROLOGY FOLLOW UP VISIT  NOTE       [facility] [place]  [address] [place] [address]  Tel: [phone]  Fax: [phone]  [email or web address]     John Dueñas,  1945, MRN [id number]  PCP: Malcolm Sun  Date: 07/10/2025      ASSESSMENT & PLAN     Visit Diagnosis  Vascular dementia without behavioral disturbance (H)     Vascular dementia without behavioral disturbance  80-year-old male with history of HTN, HLD, ICH, vascular dementia who returns for follow-up.  His symptoms are stable on combination of Aricept, Namenda and vitamin E.  His MoCA score is 17/30 today compared to 15/30 last year.  He again inquired if he can drive and I told him due to his dementia he should not be driving.  I have recommended:    1.  Continue Aricept 10 mg daily, Namenda 10 mg twice daily and vitamin  E 1000 unit twice daily.  Prescriptions were filled  2.  Check hepatic panel  3.  Follow-up in 1 year    Thank you again for this referral, please feel free to contact me if you have any questions.    Tom Rivera MD  [facility], [place]     HISTORY OF PRESENT ILLNESS     Patient is a 80-year-old gentleman with history of ICH, HTN, HLD, vascular dementia who returns for his yearly follow-up.  During his last visit he was continued on Aricept, Namenda and vitamin E.  He was told he should not drive.  Since his last visit he denies any new complaints.  Wife feels his memory is stable.  There is no behavioral issues.  He denies any bladder control issues or any gait difficulty.  He again inquired if he can drive and was told due to his dementia he should not be driving    Briefly patient is a male with a history of  HTN, HLD who was admitted to [facility] on 6/13/2020 with acute confusion and speech difficulty.  CT of the head showed  a large left parietal temporal intracerebral hemorrhage.  There was extension into the ventricle and repeat CT scan was done with gradual improvement.  MRI scan showed left temporal hemorrhage and question of amyloid angiopathy was raised.  Although with the rehab there was improvement in his strength he had significant weakness and initially was continued on Keppra that was tapered later on.  He was diagnosed with vascular dementia and started on Aricept, Namenda and vitamin E.     PROBLEM LIST   Patient Active Problem List   Diagnosis     Hypercholesteremia     Lumbar disc herniation with radiculopathy     Myopia     Impaired fasting glucose     Malignant essential hypertension     Advance care planning     Depression with anxiety     Mixed type age-related cataract, both eyes     Vascular dementia (H)     Coronary artery disease due to lipid rich plaque         PAST MEDICAL & SURGICAL HISTORY     Past Medical History:   Patient  has a past medical history of Herniated disc, Intracerebral hemorrhage, nontraumatic (H) (6/13/2020), Intracranial hemorrhage (H) (8/6/2020), SAH (subarachnoid hemorrhage) (H) (6/14/2020), and Subdural hematoma (H) (6/24/2020).    Surgical History:  He  has a past surgical history that includes tonsillectomy and Tonsillectomy (1950).     SOCIAL HISTORY     Reviewed, and he  reports that he has never smoked. He has never used smokeless tobacco. He reports that he does not currently use alcohol.     FAMILY HISTORY     Reviewed, and family history includes Chronic Obstructive Pulmonary Disease in his father; Diabetes in his maternal grandfather; Heart Disease in his brother; Multiple myeloma in his mother.     ALLERGIES     Allergies   Allergen Reactions     Ether Other (See Comments)     Lactose Other (See Comments)         REVIEW OF SYSTEMS     A 12 point  review of system was performed and was negative except as outlined in the history of present illness.     HOME MEDICATIONS     Current Outpatient Rx   Medication Sig Dispense Refill     acetaminophen (TYLENOL) 500 MG tablet Take 1 tablet by mouth       aspirin 81 MG EC tablet Take 81 mg by mouth daily       atorvastatin (LIPITOR) 20 MG tablet Take 1 tablet by mouth at bedtime       calcium citrate-vitamin D (CITRACAL) 315-250 MG-UNIT TABS per tablet Take 2 tablets by mouth       cholecalciferol (VITAMIN D-1000 MAX ST) 25 MCG (1000 UT) TABS Take 1,000 Units by mouth       donepezil (ARICEPT) 10 MG tablet Take 1 tablet (10 mg) by mouth at bedtime 90 tablet 3     escitalopram (LEXAPRO) 10 MG tablet Take 10 mg by mouth At Bedtime        Glucosamine-Chondroit-Vit C-Mn (GLUCOSAMINE CHONDROITINOITIN COMPLEX) CAPS        magnesium oxide (MAG-OX) 400 MG tablet Take 1 tablet by mouth daily at 2 pm       melatonin 5 MG tablet        memantine (NAMENDA) 10 MG tablet Take 1 tablet (10 mg) by mouth 2 times daily 180 tablet 3     metoprolol succinate ER (TOPROL XL) 25 MG 24 hr tablet Take 12.5 mg by mouth daily.       multivitamin w/minerals (THERA-VIT-M) tablet Take 1 tablet by mouth       tamsulosin (FLOMAX) 0.4 MG capsule Take 0.4 mg by mouth       triamcinolone (KENALOG) 0.025 % cream Apply topically 2 times daily       vitamin B-12 (CYANOCOBALAMIN) 1000 MCG tablet Take 1,000 mcg by mouth        vitamin E (TOCOPHEROL) 1000 units (450 mg) CAPS capsule Take 1 capsule (1,000 Units) by mouth 2 times daily 180 capsule 3         PHYSICAL EXAM     Vital signs  /70   Pulse 56   Resp 18   Wt 71.2 kg (157 lb)   BMI 22.53 kg/m      Weight:   157 lbs 0 oz      7/5/2023     9:19 AM 7/11/2024    10:35 AM 7/10/2025    10:35 AM   MOCA   Visuospatial/Executive  3 3 4   Naming 2 2 2   Attention - Digits 1 2 1   Attention - Letters 0 1 0   Attention - Subtraction 0 0 2   Language - Repeat 0 1 1   Language - Fluency  0 0 0   Abstraction  2 1 2   Delayed Recall 2 0 0   Orientation 3 5 5   Education 0 0 0   MOCA Score 13 15 17   Administered by:  Aura Felipe, REGINA Felipe, REGINA Delgado CMA       GENERAL PHYSICAL EXAM: Patient is alert and oriented x 4 in no acute distress. Neck was supple, no carotid bruits, thyromegaly, lymphadenopathy or JVD noted.   NEUROLOGICAL EXAM:  Patient is alert and oriented times 3 he can tell me his name date and place.  He scored 13/30 on MoCA today compared to 15/30 last year.  He can tell me the name of the president but he struggles with serial 7.  Pupil equal round and reactive face symmetrical tongue midline.  He has right pronator drift.  Strength on the right 5/5 on the left 5/5.  Reflexes 2+ on the right 1+ on the left he has some dysmetria on finger-nose testing he has a wide-based gait and somewhat unstable with decreased arm swing     PERTINENT DIAGNOSTIC STUDIES     Following studies were reviewed:     CT BRAIN 6/13/2020  1.  4.9 x 5.4 x 5.7 cm intraparenchymal hemorrhage centered in the left parietal lobe and extending to the left periatrial region.  There is a small surrounding rim of vasogenic type edema. Consider contrast enhanced brain MRI for further evaluation if   the patient is clinically able to get MRI.  2.  It does have local mass effect with partial effacement of the atrium of the left lateral ventricle. 2.4 mm midline shift to the right at the level of the lateral ventricles.   3.  3.4 mm subdural hematoma along the posterior left frontal convexity.     MRI LUMBAR SPINE 5/31/2023  1.  Lumbar spondylosis most pronounced at L3-L4 through L5-S1.     2.  Slight progression of degenerative changes at the L3-L4 and L4-L5 level since the 2015 exam.     3.  Decreased size of right foraminal disc protrusion at L1-L2.    MRI, MRA brain 2/1/2021  MRI BRAIN:  1. No finding for acute infarct, acute hemorrhage, or mass.     2. Area of chronic encephalomalacic change, hemosiderin staining, and  gliosis is seen along the left frontoparietal junction corresponding to the site of prior parenchymal hemorrhage.  2. Moderate to marked dilatation of the lateral ventricles, stable compared to prior.     MRA [place] [facility] [place]:  1. No vascular cutoff from aneurysm, or flow-limiting stenosis.     MRI BRAIN 12/1/2020  1.  Chronic parenchymal hematoma within the left temporoparietal lobes with encephalomalacia and volume loss. Irregular central T2 hypointensity and enhancement likely relates to scarring, no definite underlying mass. Continued follow-up recommended to  establish longer-term stability. Consider vascular imaging if not previously performed to more definitively exclude vascular etiology of hemorrhage.     MRI BRAIN 8/31/2020  1.  Previously described intraparenchymal hematoma in the left parietal lobe with extension to the left temporoparietal region has markedly decreased in size since previous, now measuring 3.0 x 1.2 cm in AP x transverse dimensions. This primarily   involves the lateral left parietal lobe with slight extension to the left frontoparietal region and is accompanied by hemosiderin staining extending into the left temporoparietal region. While there is a small amount of FLAIR hyperintense diffusion   restriction compatible with acute to very early subacute infarct along the anterior and medial margin of the above-described residual intraparenchymal hemorrhage in the left parietal lobe, this presumably relates to an evolving parenchymal injury from   the above-described intraparenchymal hematoma and is accompanied by a background of adjacent FLAIR hyperintense presumed gliosis. Without contrast, evaluation for an abnormal underlying mass is limited. Follow-up contrast-enhanced brain MRI after   resolution of the above-described residual intraparenchymal hemorrhage will be of benefit to exclude an underlying mass.     2.  Previously described subarachnoid hemorrhage and intraventricular  extension of hemorrhage have resolved in the interim.     3.  Hemosiderin staining in the left parietal lobe and left temporoparietal region is accompanied by a small amount of hemosiderin staining along the atrium and occipital horn of the left lateral ventricle.     4.  Unchanged age-related changes, as above.     MRI BRAIN 6/13/2020  1.  Large left parietal parenchymal hemorrhage again noted. It is grossly unchanged in size. There is new intraventricular extension layering in the occipital horns and left atrium lateral ventricle compared to the prior study. Temporal horns are   slightly more prominent on the prior examination. Close interval follow-up suggested. Overall mass effect and midline shift is similar to the prior examination.     2.  There is multifocal stippled and somewhat nodular enhancement around the periphery of the parietal hemorrhage as detailed above. No definite focal masslike enhancement is identified. This may represent vascular structures and can occasionally be seen   in active bleeding.     3.  Stable thin left convexity subdural hematoma.     4.  Small volume subarachnoid hemorrhage over the right frontal operculum and temporal convexity.     5.  Overall, the constellation of findings is nonspecific. Consider amyloid angiopathy. Given the stable abnormal enhancement, a component of cerebral amyloid angiopathy related inflammation is not excluded though considered less likely.     EEG 4/19/2021  Diffusely slow more so on the left side.  No sharp activity arrhythmic discharges seen to suggest seizures     EEG  6/14/2020  This is a normal EEG during wakefulness and drowsiness except due to mild background dysrhythmia and the EEG being limited due to motion artifact.  EEG is not suggestive of epilepsy.  Further clinical correlation is needed.     NEUROPSYCHOLOGICAL EVALUATION 4/1/2021  Major Vascular Neurocognitive Disorder     Adjustment Disorder with Mixed Anxiety and Depressed Mood      RECOMMENDATIONS:  1) Continued neurologic follow up is recommended for ongoing monitoring and treatment. Consider a trial of a cholinesterase inhibitor (e.g., donepezil) if not medically contraindicated, as this medication has been shown to benefit individuals with vascular cognitive impairment.     2) Ongoing participation in speech therapy (cognitive rehabilitation) is strongly encouraged. The patient appears to be benefiting from this in many ways.     3) Additional emotional support is also strongly recommended. I have provided Mr. Dueñas and his wife with various support groups in the community (for survivors and/or loved ones). I have also discussed with him a referral to see our psychologist for individual psychotherapy to help him process, cope, and even come to accept some of the losses he has endured as a result of his CVAs. Mr. Dueñas is not interested in either of these suggestions at this time, but asked that I mail him these resources in case he changes his mind. He does get some additional support from his Worship.     4) Consider an adjustment to his antidepressant medication regimen (either increasing the dosage or trying an alternative) to help better manage his emotional symptoms.     5) Mr. Dueñas is encouraged to adhere closely to his medication regimen in general, in order to keep his cerebrovascular risk factors (e.g., high blood pressure and high cholesterol) well managed. This may help to prevent future CVAs and additional cognitive decline.     6) If not already done, completion of paperwork for advance directives and assignment of healthcare and financial Power of  should be considered at this time.     7) Mr. Dueñas is in a living situation in which most of his complex daily activities (such as driving and financial management) are arranged and managed for him. This all remains appropriate. Thus, he is quite fortunate in that, despite the evidence of cognitive impairment, his current  independent living situation appears to be working well for him.     8) Mr. Dueñas is encouraged to remain physically, socially, and mentally active in order to optimize his brain health.     9) Neuropsychological follow-up is recommended in approximately 12 months (or as clinically indicated) in order to monitor his cognitive status and update recommendations. The current test data can be used as a baseline to which future comparisons can be made     PERTINENT LABS  Following labs were reviewed:  No visits with results within 3 Month(s) from this visit.   Latest known visit with results is:   Lab on 07/11/2024   Component Date Value Ref Range Status     Protein Total 07/11/2024 6.3 (L)  6.4 - 8.3 g/dL Final     Albumin 07/11/2024 4.1  3.5 - 5.2 g/dL Final     Bilirubin Total 07/11/2024 0.6  <=1.2 mg/dL Final     Alkaline Phosphatase 07/11/2024 81  40 - 150 U/L Final     AST 07/11/2024 30  0 - 45 U/L Final     ALT 07/11/2024 37  0 - 70 U/L Final     Bilirubin Direct 07/11/2024 <0.20  0.00 - 0.30 mg/dL Final         Total time spent for face to face visit, reviewing labs/imaging studies, counseling and coordination of care was: 30 Minutes spent on the date of the encounter doing chart review, review of outside records, review of test results, interpretation of tests, patient visit, documentation, and discussion with family     The longitudinal plan of care for the diagnosis(es)/condition(s) as documented were addressed during this visit. Due to the added complexity in care, I will continue to support John in the subsequent management and with ongoing continuity of care.    This note was dictated using voice recognition software.  Any grammatical or context distortions are unintentional and inherent to the software.    No orders of the defined types were placed in this encounter.     New Prescriptions    No medications on file     Modified Medications    No medications on file                 Again, thank you for  allowing me to participate in the care of your patient.        Sincerely,        Tom Rivera MD    Electronically signed